# Patient Record
Sex: FEMALE | Race: WHITE | Employment: FULL TIME | URBAN - METROPOLITAN AREA
[De-identification: names, ages, dates, MRNs, and addresses within clinical notes are randomized per-mention and may not be internally consistent; named-entity substitution may affect disease eponyms.]

---

## 2018-04-24 ENCOUNTER — HOSPITAL ENCOUNTER (OUTPATIENT)
Dept: MAMMOGRAPHY | Age: 33
Discharge: HOME | End: 2018-04-24
Attending: OBSTETRICS & GYNECOLOGY
Payer: COMMERCIAL

## 2018-04-24 DIAGNOSIS — N63.10 BREAST MASS, RIGHT: ICD-10-CM

## 2018-04-24 PROCEDURE — 76642 ULTRASOUND BREAST LIMITED: CPT

## 2018-04-24 PROCEDURE — 77066 DX MAMMO INCL CAD BI: CPT

## 2019-01-03 PROBLEM — O34.219 HISTORY OF CESAREAN SECTION COMPLICATING PREGNANCY: Status: ACTIVE | Noted: 2019-01-03

## 2021-02-18 ENCOUNTER — HOSPITAL ENCOUNTER (OUTPATIENT)
Dept: SLEEP MEDICINE | Age: 36
Discharge: HOME | End: 2021-02-18
Payer: COMMERCIAL

## 2021-02-18 PROCEDURE — 95810 POLYSOM 6/> YRS 4/> PARAM: CPT

## 2022-03-19 PROBLEM — O34.219 HISTORY OF CESAREAN SECTION COMPLICATING PREGNANCY: Status: ACTIVE | Noted: 2019-01-03

## 2022-12-27 ENCOUNTER — OFFICE VISIT (OUTPATIENT)
Dept: PRIMARY CARE CLINIC | Facility: CLINIC | Age: 37
End: 2022-12-27
Payer: COMMERCIAL

## 2022-12-27 VITALS
BODY MASS INDEX: 45.99 KG/M2 | SYSTOLIC BLOOD PRESSURE: 136 MMHG | WEIGHT: 293 LBS | HEIGHT: 67 IN | OXYGEN SATURATION: 98 % | HEART RATE: 89 BPM | DIASTOLIC BLOOD PRESSURE: 96 MMHG | TEMPERATURE: 98.4 F

## 2022-12-27 DIAGNOSIS — Z83.3 FAMILY HISTORY OF DIABETES MELLITUS: ICD-10-CM

## 2022-12-27 DIAGNOSIS — R05.3 CHRONIC COUGH: ICD-10-CM

## 2022-12-27 DIAGNOSIS — Z76.89 ENCOUNTER TO ESTABLISH CARE: Primary | ICD-10-CM

## 2022-12-27 DIAGNOSIS — E55.9 VITAMIN D DEFICIENCY: ICD-10-CM

## 2022-12-27 DIAGNOSIS — Z13.220 SCREENING FOR LIPID DISORDERS: ICD-10-CM

## 2022-12-27 DIAGNOSIS — R09.82 POST-NASAL DRAINAGE: ICD-10-CM

## 2022-12-27 DIAGNOSIS — Z79.899 ENCOUNTER FOR LONG-TERM (CURRENT) USE OF MEDICATIONS: ICD-10-CM

## 2022-12-27 DIAGNOSIS — I10 ESSENTIAL HYPERTENSION: ICD-10-CM

## 2022-12-27 DIAGNOSIS — F41.9 ANXIETY: ICD-10-CM

## 2022-12-27 PROCEDURE — 3078F DIAST BP <80 MM HG: CPT | Performed by: FAMILY MEDICINE

## 2022-12-27 PROCEDURE — 99214 OFFICE O/P EST MOD 30 MIN: CPT | Performed by: FAMILY MEDICINE

## 2022-12-27 PROCEDURE — 3074F SYST BP LT 130 MM HG: CPT | Performed by: FAMILY MEDICINE

## 2022-12-27 RX ORDER — TIRZEPATIDE 7.5 MG/.5ML
7.5 INJECTION, SOLUTION SUBCUTANEOUS WEEKLY
Qty: 2 ML | Refills: 5 | Status: SHIPPED | OUTPATIENT
Start: 2023-02-25

## 2022-12-27 RX ORDER — LEVOCETIRIZINE DIHYDROCHLORIDE 5 MG/1
5 TABLET, FILM COATED ORAL NIGHTLY
Qty: 30 TABLET | Refills: 5 | Status: SHIPPED | OUTPATIENT
Start: 2022-12-27

## 2022-12-27 RX ORDER — MONTELUKAST SODIUM 10 MG/1
10 TABLET ORAL DAILY
Qty: 30 TABLET | Refills: 5 | Status: SHIPPED | OUTPATIENT
Start: 2022-12-27

## 2022-12-27 RX ORDER — TIRZEPATIDE 5 MG/.5ML
5 INJECTION, SOLUTION SUBCUTANEOUS WEEKLY
Qty: 2 ML | Refills: 0 | Status: SHIPPED | OUTPATIENT
Start: 2023-01-26 | End: 2023-02-25

## 2022-12-27 RX ORDER — ESCITALOPRAM OXALATE 10 MG/1
10 TABLET ORAL DAILY
Qty: 30 TABLET | Refills: 5 | Status: SHIPPED | OUTPATIENT
Start: 2022-12-27

## 2022-12-27 RX ORDER — TIRZEPATIDE 2.5 MG/.5ML
2.5 INJECTION, SOLUTION SUBCUTANEOUS WEEKLY
Qty: 2 ML | Refills: 0 | Status: SHIPPED | OUTPATIENT
Start: 2022-12-27 | End: 2023-01-26

## 2022-12-27 RX ORDER — OLMESARTAN MEDOXOMIL 20 MG/1
20 TABLET ORAL DAILY
Qty: 30 TABLET | Refills: 5 | Status: SHIPPED | OUTPATIENT
Start: 2022-12-27

## 2022-12-27 ASSESSMENT — PATIENT HEALTH QUESTIONNAIRE - PHQ9
SUM OF ALL RESPONSES TO PHQ QUESTIONS 1-9: 0
1. LITTLE INTEREST OR PLEASURE IN DOING THINGS: 0
SUM OF ALL RESPONSES TO PHQ QUESTIONS 1-9: 0
SUM OF ALL RESPONSES TO PHQ QUESTIONS 1-9: 0
SUM OF ALL RESPONSES TO PHQ9 QUESTIONS 1 & 2: 0
2. FEELING DOWN, DEPRESSED OR HOPELESS: 0
SUM OF ALL RESPONSES TO PHQ QUESTIONS 1-9: 0

## 2022-12-27 NOTE — PROGRESS NOTES
Que Lee M.D.  [address] [facility]  Phone:  [phone]  Fax:  [phone]:  Chief Complaint   Patient presents with    New Patient     Former patient of Dr Kelleen Runner    Hypertension     Checked BP at home and it was elevated. She is under a lot of stress and anxiety. Does get symptomatic     Weight Management     Tried saxenda in the past. Wants to possibly start mounjaro, her  takes mounjaro and he has good affects     Cough     Has sinus issues. Chronic productive cough for 1 month. Was given prednisone and cough med and cough has still not approved. HISTORY OF PRESENT ILLNESS:  Ms. Mary Ellen Bateman is a 30 y.o. female  who presents as a new patient. Her blood pressure has been elevated recently due to stress. Denies chest pain, shortness of breath, headaches or blurred vision. She is complaining of coughing for the past month. The cough is productive of thick mucus. She has post nasal drainage. She went to the urgent care and was given prednisone and cough medicine. Her  takes Marian Mo for his diabetes. She has tried [place] in the past. She would like to try Marian Mo for weight loss. No other complaints. She is not taking any medications. HISTORY:  No Known Allergies      REVIEW OF SYSTEMS:  Review of systems is as indicated in HPI otherwise negative. PHYSICAL EXAM:  Vital Signs -   Visit Vitals  BP (!) 136/96 (Site: Left Upper Arm)   Pulse 89   Temp 98.4 °F (36.9 °C) (Temporal)   Ht 5' 7\" (1.702 m)   Wt (!) 392 lb (177.8 kg)   SpO2 98%   BMI 61.40 kg/m²        Physical Exam  Vitals and nursing note reviewed. Constitutional:       Appearance: Normal appearance. HENT:      Head: Normocephalic and atraumatic. Nose: Nose normal.      Mouth/Throat:      Mouth: Mucous membranes are moist.   Eyes:      Extraocular Movements: Extraocular movements intact.       Pupils: Pupils are equal, round, and reactive to light. Cardiovascular:      Rate and Rhythm: Normal rate and regular rhythm. Pulses: Normal pulses. Pulmonary:      Effort: Pulmonary effort is normal.      Breath sounds: Normal breath sounds. Abdominal:      General: Abdomen is flat. Palpations: Abdomen is soft. Musculoskeletal:         General: Normal range of motion. Cervical back: Normal range of motion and neck supple. Skin:     General: Skin is warm and dry. Neurological:      Mental Status: She is alert. Psychiatric:         Mood and Affect: Mood normal.         Behavior: Behavior normal.         PHQ:  PHQ-9  12/27/2022   Little interest or pleasure in doing things 0   Feeling down, depressed, or hopeless 0   PHQ-2 Score 0   PHQ-9 Total Score 0       LABS  No results found for this visit on 12/27/22.   Office Visit on 01/21/2021   Component Date Value Ref Range Status    WBC 01/21/2021 8.1  3.4 - 10.8 x10E3/uL Final    RBC 01/21/2021 4.75  3.77 - 5.28 x10E6/uL Final    Hemoglobin 01/21/2021 13.6  11.1 - 15.9 g/dL Final    Hematocrit 01/21/2021 40.2  34.0 - 46.6 % Final    MCV 01/21/2021 85  79 - 97 fL Final    MCH 01/21/2021 28.6  26.6 - 33.0 pg Final    MCHC 01/21/2021 33.8  31.5 - 35.7 g/dL Final    RDW 01/21/2021 13.0  11.7 - 15.4 % Final    Platelets 22/16/3551 260  150 - 450 x10E3/uL Final    Neutrophils % 01/21/2021 42  Not Estab. % Final    Lymphocytes % 01/21/2021 44  Not Estab. % Final    Monocytes % 01/21/2021 6  Not Estab. % Final    Eosinophils % 01/21/2021 7  Not Estab. % Final    Basophils % 01/21/2021 1  Not Estab. % Final    Neutrophils Absolute 01/21/2021 3.4  1.4 - 7.0 x10E3/uL Final    Lymphocytes Absolute 01/21/2021 3.5 (A)  0.7 - 3.1 x10E3/uL Final    Monocytes Absolute 01/21/2021 0.5  0.1 - 0.9 x10E3/uL Final    Eosinophils Absolute 01/21/2021 0.6 (A)  0.0 - 0.4 x10E3/uL Final    Basophils Absolute 01/21/2021 0.1  0.0 - 0.2 x10E3/uL Final    Immature Granulocytes 01/21/2021 0  Not Estab. % Final Granulocyte Absolute Count 01/21/2021 0.0  0.0 - 0.1 x10E3/uL Final    TSH 01/21/2021 1.780  0.450 - 4.500 uIU/mL Final    Glucose 01/21/2021 74  65 - 99 mg/dL Final    BUN 01/21/2021 11  6 - 20 mg/dL Final    Creatinine 01/21/2021 0.59  0.57 - 1.00 mg/dL Final    EGFR IF NonAfrican American 01/21/2021 121  >59 mL/min/1.73 Final    GFR  01/21/2021 139  >59 mL/min/1.73 Final    Bun/Cre Ratio 01/21/2021 19  9 - 23 NA Final    Sodium 01/21/2021 139  134 - 144 mmol/L Final    Potassium 01/21/2021 4.5  3.5 - 5.2 mmol/L Final    Chloride 01/21/2021 102  96 - 106 mmol/L Final    CO2 01/21/2021 21  20 - 29 mmol/L Final    Calcium 01/21/2021 9.6  8.7 - 10.2 mg/dL Final    Total Protein 01/21/2021 7.3  6.0 - 8.5 g/dL Final    Albumin 01/21/2021 4.4  3.8 - 4.8 g/dL Final    Globulin, Total 01/21/2021 2.9  1.5 - 4.5 g/dL Final    Albumin/Globulin Ratio 01/21/2021 1.5  1.2 - 2.2 NA Final    Total Bilirubin 01/21/2021 <0.2  0.0 - 1.2 mg/dL Final    Alkaline Phosphatase 01/21/2021 65  39 - 117 IU/L Final    AST 01/21/2021 13  0 - 40 IU/L Final    ALT 01/21/2021 17  0 - 32 IU/L Final       IMPRESSION/PLAN     Diagnosis Orders   1. Encounter to establish care        2. Essential hypertension  olmesartan (BENICAR) 20 MG tablet    CBC with Auto Differential    Comprehensive Metabolic Panel      3. Chronic cough  levocetirizine (XYZAL) 5 MG tablet    montelukast (SINGULAIR) 10 MG tablet      4. Post-nasal drainage  levocetirizine (XYZAL) 5 MG tablet    montelukast (SINGULAIR) 10 MG tablet      5. Vitamin D deficiency  Vitamin D 25 Hydroxy      6. Anxiety  escitalopram (LEXAPRO) 10 MG tablet      7. Family history of diabetes mellitus  Tirzepatide (MOUNJARO) 2.5 MG/0.5ML SOPN SC injection    Tirzepatide (MOUNJARO) 5 MG/0.5ML SOPN SC injection    Tirzepatide (MOUNJARO) 7.5 MG/0.5ML SOPN SC injection    Hemoglobin A1C    mother's side      8. Screening for lipid disorders  Lipid Panel      9.  Encounter for long-term (current) use of medications  CBC with Auto Differential    Comprehensive Metabolic Panel          Follow up and Dispositions:  Return in about 2 months (around 2/27/2023) for SCHEDULE LABS SOON.     Will add the following medications: Benicar 20 mg daily for hypertension, Xyzal 5 mg daily, Singulair 10 mg daily, Lexapro 10 mg daily and Mounjaro 2.5 mg weekly.   Reviewed medications and side effects in detail.  Will check the above labs.  Reviewed diet, exercise and weight control.  Cardiovascular risks and recommendations reviewed.  Patient encouraged to follow a low sodium diet.    Kori Karimi MD    Dictated using voice recognition software. Proofread, but unrecognized voice recognition errors may exist.

## 2023-01-06 DIAGNOSIS — I10 ESSENTIAL HYPERTENSION: ICD-10-CM

## 2023-01-06 DIAGNOSIS — Z79.899 ENCOUNTER FOR LONG-TERM (CURRENT) USE OF MEDICATIONS: ICD-10-CM

## 2023-01-06 DIAGNOSIS — E55.9 VITAMIN D DEFICIENCY: ICD-10-CM

## 2023-01-06 DIAGNOSIS — Z83.3 FAMILY HISTORY OF DIABETES MELLITUS: ICD-10-CM

## 2023-01-06 DIAGNOSIS — Z13.220 SCREENING FOR LIPID DISORDERS: ICD-10-CM

## 2023-01-06 LAB
25(OH)D3 SERPL-MCNC: 17.4 NG/ML (ref 30–100)
ALBUMIN SERPL-MCNC: 3.7 G/DL (ref 3.5–5)
ALBUMIN/GLOB SERPL: 1 (ref 0.4–1.6)
ALP SERPL-CCNC: 49 U/L (ref 50–136)
ALT SERPL-CCNC: 25 U/L (ref 12–65)
ANION GAP SERPL CALC-SCNC: 8 MMOL/L (ref 2–11)
AST SERPL-CCNC: 16 U/L (ref 15–37)
BASOPHILS # BLD: 0 K/UL (ref 0–0.2)
BASOPHILS NFR BLD: 0 % (ref 0–2)
BILIRUB SERPL-MCNC: 0.3 MG/DL (ref 0.2–1.1)
BUN SERPL-MCNC: 12 MG/DL (ref 6–23)
CALCIUM SERPL-MCNC: 9.1 MG/DL (ref 8.3–10.4)
CHLORIDE SERPL-SCNC: 108 MMOL/L (ref 101–110)
CHOLEST SERPL-MCNC: 182 MG/DL
CO2 SERPL-SCNC: 24 MMOL/L (ref 21–32)
CREAT SERPL-MCNC: 0.7 MG/DL (ref 0.6–1)
DIFFERENTIAL METHOD BLD: ABNORMAL
EOSINOPHIL # BLD: 0.4 K/UL (ref 0–0.8)
EOSINOPHIL NFR BLD: 6 % (ref 0.5–7.8)
ERYTHROCYTE [DISTWIDTH] IN BLOOD BY AUTOMATED COUNT: 14.7 % (ref 11.9–14.6)
EST. AVERAGE GLUCOSE BLD GHB EST-MCNC: 97 MG/DL
GLOBULIN SER CALC-MCNC: 3.7 G/DL (ref 2.8–4.5)
GLUCOSE SERPL-MCNC: 83 MG/DL (ref 65–100)
HBA1C MFR BLD: 5 % (ref 4.8–5.6)
HCT VFR BLD AUTO: 39.1 % (ref 35.8–46.3)
HDLC SERPL-MCNC: 58 MG/DL (ref 40–60)
HDLC SERPL: 3.1
HGB BLD-MCNC: 12.3 G/DL (ref 11.7–15.4)
IMM GRANULOCYTES # BLD AUTO: 0 K/UL (ref 0–0.5)
IMM GRANULOCYTES NFR BLD AUTO: 0 % (ref 0–5)
LDLC SERPL CALC-MCNC: 106.6 MG/DL
LYMPHOCYTES # BLD: 2.2 K/UL (ref 0.5–4.6)
LYMPHOCYTES NFR BLD: 34 % (ref 13–44)
MCH RBC QN AUTO: 27.8 PG (ref 26.1–32.9)
MCHC RBC AUTO-ENTMCNC: 31.5 G/DL (ref 31.4–35)
MCV RBC AUTO: 88.3 FL (ref 82–102)
MONOCYTES # BLD: 0.3 K/UL (ref 0.1–1.3)
MONOCYTES NFR BLD: 5 % (ref 4–12)
NEUTS SEG # BLD: 3.6 K/UL (ref 1.7–8.2)
NEUTS SEG NFR BLD: 55 % (ref 43–78)
NRBC # BLD: 0 K/UL (ref 0–0.2)
PLATELET # BLD AUTO: 224 K/UL (ref 150–450)
PMV BLD AUTO: 12.2 FL (ref 9.4–12.3)
POTASSIUM SERPL-SCNC: 4.2 MMOL/L (ref 3.5–5.1)
PROT SERPL-MCNC: 7.4 G/DL (ref 6.3–8.2)
RBC # BLD AUTO: 4.43 M/UL (ref 4.05–5.2)
SODIUM SERPL-SCNC: 140 MMOL/L (ref 133–143)
TRIGL SERPL-MCNC: 87 MG/DL (ref 35–150)
VLDLC SERPL CALC-MCNC: 17.4 MG/DL (ref 6–23)
WBC # BLD AUTO: 6.7 K/UL (ref 4.3–11.1)

## 2023-01-08 DIAGNOSIS — E55.9 VITAMIN D DEFICIENCY: Primary | ICD-10-CM

## 2023-01-08 RX ORDER — ERGOCALCIFEROL 1.25 MG/1
50000 CAPSULE ORAL WEEKLY
Qty: 12 CAPSULE | Refills: 1 | Status: SHIPPED | OUTPATIENT
Start: 2023-01-08

## 2023-01-09 NOTE — RESULT ENCOUNTER NOTE
1. Vitamin D: low at 17.4 - start weekly vitamin D. Prescription has been sent to the pharmacy. Remainder of your labs are good. Patient notified via [address]

## 2023-02-27 ENCOUNTER — OFFICE VISIT (OUTPATIENT)
Dept: PRIMARY CARE CLINIC | Facility: CLINIC | Age: 38
End: 2023-02-27
Payer: COMMERCIAL

## 2023-02-27 VITALS
HEART RATE: 55 BPM | TEMPERATURE: 98.1 F | BODY MASS INDEX: 45.99 KG/M2 | SYSTOLIC BLOOD PRESSURE: 117 MMHG | HEIGHT: 67 IN | WEIGHT: 293 LBS | DIASTOLIC BLOOD PRESSURE: 78 MMHG | OXYGEN SATURATION: 99 %

## 2023-02-27 DIAGNOSIS — Z79.899 ENCOUNTER FOR LONG-TERM (CURRENT) USE OF MEDICATIONS: ICD-10-CM

## 2023-02-27 DIAGNOSIS — E55.9 VITAMIN D DEFICIENCY: ICD-10-CM

## 2023-02-27 DIAGNOSIS — E66.01 MORBID OBESITY WITH BMI OF 60.0-69.9, ADULT (HCC): ICD-10-CM

## 2023-02-27 DIAGNOSIS — I10 ESSENTIAL HYPERTENSION: Primary | ICD-10-CM

## 2023-02-27 DIAGNOSIS — M79.89 LEG SWELLING: ICD-10-CM

## 2023-02-27 PROCEDURE — 99213 OFFICE O/P EST LOW 20 MIN: CPT | Performed by: FAMILY MEDICINE

## 2023-02-27 PROCEDURE — 3074F SYST BP LT 130 MM HG: CPT | Performed by: FAMILY MEDICINE

## 2023-02-27 PROCEDURE — 3078F DIAST BP <80 MM HG: CPT | Performed by: FAMILY MEDICINE

## 2023-02-27 RX ORDER — FUROSEMIDE 20 MG/1
20 TABLET ORAL 2 TIMES DAILY PRN
Qty: 60 TABLET | Refills: 5 | Status: SHIPPED | OUTPATIENT
Start: 2023-02-27

## 2023-02-27 RX ORDER — TIRZEPATIDE 7.5 MG/.5ML
7.5 INJECTION, SOLUTION SUBCUTANEOUS WEEKLY
Qty: 4 ADJUSTABLE DOSE PRE-FILLED PEN SYRINGE | Refills: 5 | Status: SHIPPED | OUTPATIENT
Start: 2023-02-27

## 2023-02-27 RX ORDER — AMOXICILLIN 875 MG/1
TABLET, COATED ORAL
COMMUNITY
Start: 2023-02-15

## 2023-02-27 RX ORDER — ERGOCALCIFEROL 1.25 MG/1
50000 CAPSULE ORAL WEEKLY
Qty: 12 CAPSULE | Refills: 3 | Status: SHIPPED | OUTPATIENT
Start: 2023-02-27

## 2023-02-27 SDOH — ECONOMIC STABILITY: HOUSING INSECURITY
IN THE LAST 12 MONTHS, WAS THERE A TIME WHEN YOU DID NOT HAVE A STEADY PLACE TO SLEEP OR SLEPT IN A SHELTER (INCLUDING NOW)?: PATIENT REFUSED

## 2023-02-27 SDOH — ECONOMIC STABILITY: INCOME INSECURITY: HOW HARD IS IT FOR YOU TO PAY FOR THE VERY BASICS LIKE FOOD, HOUSING, MEDICAL CARE, AND HEATING?: PATIENT DECLINED

## 2023-02-27 SDOH — ECONOMIC STABILITY: FOOD INSECURITY: WITHIN THE PAST 12 MONTHS, THE FOOD YOU BOUGHT JUST DIDN'T LAST AND YOU DIDN'T HAVE MONEY TO GET MORE.: PATIENT DECLINED

## 2023-02-27 SDOH — ECONOMIC STABILITY: FOOD INSECURITY: WITHIN THE PAST 12 MONTHS, YOU WORRIED THAT YOUR FOOD WOULD RUN OUT BEFORE YOU GOT MONEY TO BUY MORE.: PATIENT DECLINED

## 2023-02-27 ASSESSMENT — PATIENT HEALTH QUESTIONNAIRE - PHQ9
SUM OF ALL RESPONSES TO PHQ QUESTIONS 1-9: 0
SUM OF ALL RESPONSES TO PHQ9 QUESTIONS 1 & 2: 0
SUM OF ALL RESPONSES TO PHQ QUESTIONS 1-9: 0
2. FEELING DOWN, DEPRESSED OR HOPELESS: 0
1. LITTLE INTEREST OR PLEASURE IN DOING THINGS: 0

## 2023-02-27 NOTE — PROGRESS NOTES
Fady Rodrigues M.D.  [address] [facility]  Phone:  [phone]  Fax:  [phone]    CHIEF COMPLAINT:  Chief Complaint   Patient presents with    Obesity     Pt states she feels the Rubén Said is helping her appetite. She is still having problems with portion control. Hypertension     Pt does not check blood pressure at home. Pt states she feels better on the medication. HISTORY OF PRESENT ILLNESS:  Ms. Cass Covarrubias is a 30 y.o. female  who presents for follow up. Patient states that she has been doing well. She is taking and Mounjaro and has lost a few pounds. She has been having some swelling in her legs. Patient states that she used to take furosemide. She denies any associated shortness of breath. She denies any chest pain. Her blood pressures been under control. She takes Benicar 20 mg daily. Denies chest pain, shortness of breath, headaches or blurred vision. No other complaints. Taking medications as prescribed. Medications reviewed and updated. HISTORY:  No Known Allergies      REVIEW OF SYSTEMS:  Review of systems is as indicated in HPI otherwise negative. PHYSICAL EXAM:  Vital Signs -   Visit Vitals  /78   Pulse 55   Temp 98.1 °F (36.7 °C) (Temporal)   Ht 5' 7\" (1.702 m)   Wt (!) 391 lb 12.8 oz (177.7 kg)   SpO2 99%   BMI 61.36 kg/m²        Physical Exam  Vitals and nursing note reviewed. Constitutional:       Appearance: Normal appearance. HENT:      Head: Normocephalic and atraumatic. Nose: Nose normal.      Mouth/Throat:      Mouth: Mucous membranes are moist.   Eyes:      Extraocular Movements: Extraocular movements intact. Pupils: Pupils are equal, round, and reactive to light. Cardiovascular:      Rate and Rhythm: Normal rate and regular rhythm. Pulses: Normal pulses. Pulmonary:      Effort: Pulmonary effort is normal.      Breath sounds: Normal breath sounds.    Abdominal: General: Abdomen is flat. Palpations: Abdomen is soft. Musculoskeletal:         General: Normal range of motion. Cervical back: Normal range of motion and neck supple. Skin:     General: Skin is warm and dry. Neurological:      Mental Status: She is alert. Psychiatric:         Mood and Affect: Mood normal.         Behavior: Behavior normal.         PHQ:  PHQ-9  2/27/2023   Little interest or pleasure in doing things 0   Feeling down, depressed, or hopeless 0   PHQ-2 Score 0   PHQ-9 Total Score 0       LABS  No results found for this visit on 02/27/23.   Orders Only on 01/06/2023   Component Date Value Ref Range Status    Vit D, 25-Hydroxy 01/06/2023 17.4 (A)  30.0 - 100.0 ng/mL Final    Hemoglobin A1C 01/06/2023 5.0  4.8 - 5.6 % Final    eAG 01/06/2023 97  mg/dL Final    Comment: Reference Range  Normal: 4.8-5.6  Diabetic >=6.5%  Normal       <5.7%      Cholesterol, Total 01/06/2023 182  <200 MG/DL Final    Comment: Borderline High: 200-239 mg/dL  High: Greater than or equal to 240 mg/dL      Triglycerides 01/06/2023 87  35 - 150 MG/DL Final    Comment: Borderline High: 150-199 mg/dL, High: 200-499 mg/dL  Very High: Greater than or equal to 500 mg/dL      HDL 01/06/2023 58  40 - 60 MG/DL Final    LDL Calculated 01/06/2023 106.6 (A)  <100 MG/DL Final    Comment: Near Optimal: 100-129 mg/dL  Borderline High: 130-159, High: 160-189 mg/dL  Very High: Greater than or equal to 190 mg/dL      VLDL Cholesterol Calculated 01/06/2023 17.4  6.0 - 23.0 MG/DL Final    Chol/HDL Ratio 01/06/2023 3.1    Final    Sodium 01/06/2023 140  133 - 143 mmol/L Final    Potassium 01/06/2023 4.2  3.5 - 5.1 mmol/L Final    Chloride 01/06/2023 108  101 - 110 mmol/L Final    CO2 01/06/2023 24  21 - 32 mmol/L Final    Anion Gap 01/06/2023 8  2 - 11 mmol/L Final    Glucose 01/06/2023 83  65 - 100 mg/dL Final    BUN 01/06/2023 12  6 - 23 MG/DL Final    Creatinine 01/06/2023 0.70  0.6 - 1.0 MG/DL Final    Est, Glom Filt Rate 01/06/2023 >60  >60 ml/min/1.73m2 Final    Comment:   Pediatric calculator link: Karla.at. org/professionals/kdoqi/gfr_calculatorped    These results are not intended for use in patients <25years of age. eGFR results are calculated without a race factor using  the 2021 CKD-EPI equation. Careful clinical correlation is recommended, particularly when comparing to results calculated using previous equations. The CKD-EPI equation is less accurate in patients with extremes of muscle mass, extra-renal metabolism of creatinine, excessive creatine ingestion, or following therapy that affects renal tubular secretion.       Calcium 01/06/2023 9.1  8.3 - 10.4 MG/DL Final    Total Bilirubin 01/06/2023 0.3  0.2 - 1.1 MG/DL Final    ALT 01/06/2023 25  12 - 65 U/L Final    AST 01/06/2023 16  15 - 37 U/L Final    Alk Phosphatase 01/06/2023 49 (A)  50 - 136 U/L Final    Total Protein 01/06/2023 7.4  6.3 - 8.2 g/dL Final    Albumin 01/06/2023 3.7  3.5 - 5.0 g/dL Final    Globulin 01/06/2023 3.7  2.8 - 4.5 g/dL Final    Albumin/Globulin Ratio 01/06/2023 1.0  0.4 - 1.6   Final    WBC 01/06/2023 6.7  4.3 - 11.1 K/uL Final    RBC 01/06/2023 4.43  4.05 - 5.2 M/uL Final    Hemoglobin 01/06/2023 12.3  11.7 - 15.4 g/dL Final    Hematocrit 01/06/2023 39.1  35.8 - 46.3 % Final    MCV 01/06/2023 88.3  82 - 102 FL Final    MCH 01/06/2023 27.8  26.1 - 32.9 PG Final    MCHC 01/06/2023 31.5  31.4 - 35.0 g/dL Final    RDW 01/06/2023 14.7 (A)  11.9 - 14.6 % Final    Platelets 83/45/9002 224  150 - 450 K/uL Final    MPV 01/06/2023 12.2  9.4 - 12.3 FL Final    nRBC 01/06/2023 0.00  0.0 - 0.2 K/uL Final    **Note: Absolute NRBC parameter is now reported with Hemogram**    Differential Type 01/06/2023 AUTOMATED    Final    Seg Neutrophils 01/06/2023 55  43 - 78 % Final    Lymphocytes 01/06/2023 34  13 - 44 % Final    Monocytes 01/06/2023 5  4.0 - 12.0 % Final    Eosinophils % 01/06/2023 6  0.5 - 7.8 % Final    Basophils 01/06/2023 0  0.0 - 2.0 % Final    Immature Granulocytes 01/06/2023 0  0.0 - 5.0 % Final    Segs Absolute 01/06/2023 3.6  1.7 - 8.2 K/UL Final    Absolute Lymph # 01/06/2023 2.2  0.5 - 4.6 K/UL Final    Absolute Mono # 01/06/2023 0.3  0.1 - 1.3 K/UL Final    Absolute Eos # 01/06/2023 0.4  0.0 - 0.8 K/UL Final    Basophils Absolute 01/06/2023 0.0  0.0 - 0.2 K/UL Final    Absolute Immature Granulocyte 01/06/2023 0.0  0.0 - 0.5 K/UL Final       IMPRESSION/PLAN     Diagnosis Orders   1. Essential hypertension        2. Morbid obesity with BMI of 60.0-69.9, adult (HCC)  Tirzepatide (MOUNJARO) 7.5 MG/0.5ML SOPN SC injection      3. Leg swelling  furosemide (LASIX) 20 MG tablet      4. Vitamin D deficiency  vitamin D (ERGOCALCIFEROL) 1.25 MG (37225 UT) CAPS capsule      5. Encounter for long-term (current) use of medications            Follow up and Dispositions:  Return in about 4 months (around 6/27/2023) for [place] [address]. Patient is stable on medications and will continue current medications. Refilled the above medications. Will add the following medications: Furosemide 20 mg as needed for leg swelling. Will change the following medications: increase Mounjaro to 7.5 mg weekly. Reviewed medications and side effects in detail. Reviewed most recent labs. Reviewed diet, exercise and weight control. Corrie Brooke MD    Dictated using voice recognition software.  Proofread, but unrecognized voice recognition errors may exist.

## 2023-03-10 ENCOUNTER — PATIENT MESSAGE (OUTPATIENT)
Dept: PRIMARY CARE CLINIC | Facility: CLINIC | Age: 38
End: 2023-03-10

## 2023-03-10 DIAGNOSIS — E66.01 MORBID OBESITY WITH BMI OF 60.0-69.9, ADULT (HCC): Primary | ICD-10-CM

## 2023-03-10 RX ORDER — DULAGLUTIDE 0.75 MG/.5ML
0.75 INJECTION, SOLUTION SUBCUTANEOUS WEEKLY
Qty: 4 ADJUSTABLE DOSE PRE-FILLED PEN SYRINGE | Refills: 0 | Status: SHIPPED | OUTPATIENT
Start: 2023-03-10 | End: 2023-04-09

## 2023-03-10 RX ORDER — DULAGLUTIDE 3 MG/.5ML
3 INJECTION, SOLUTION SUBCUTANEOUS WEEKLY
Qty: 4 ADJUSTABLE DOSE PRE-FILLED PEN SYRINGE | Refills: 5 | Status: SHIPPED | OUTPATIENT
Start: 2023-05-09

## 2023-03-10 RX ORDER — DULAGLUTIDE 1.5 MG/.5ML
1.5 INJECTION, SOLUTION SUBCUTANEOUS WEEKLY
Qty: 4 ADJUSTABLE DOSE PRE-FILLED PEN SYRINGE | Refills: 0 | Status: SHIPPED | OUTPATIENT
Start: 2023-04-09 | End: 2023-05-09

## 2023-03-10 NOTE — TELEPHONE ENCOUNTER
From: Kelly Teran  To: Dr. Camejo Leonard: 3/10/2023 12:08 PM EST  Subject: Kenny Singh    I have not been able to fill my prescription and now am two weeks of not having Monjaro. Would there be another good weight loss shot I could try while I'm still waiting on the Kenny Singh to come back into stock? The [facility] said there is absolutely no where that currently has it.

## 2023-04-21 DIAGNOSIS — I10 ESSENTIAL HYPERTENSION: ICD-10-CM

## 2023-04-22 RX ORDER — OLMESARTAN MEDOXOMIL 20 MG/1
TABLET ORAL
Qty: 30 TABLET | Refills: 5 | Status: SHIPPED | OUTPATIENT
Start: 2023-04-22

## 2023-05-11 DIAGNOSIS — R05.3 CHRONIC COUGH: ICD-10-CM

## 2023-05-11 DIAGNOSIS — R09.82 POST-NASAL DRAINAGE: ICD-10-CM

## 2023-05-15 RX ORDER — LEVOCETIRIZINE DIHYDROCHLORIDE 5 MG/1
5 TABLET, FILM COATED ORAL NIGHTLY
Qty: 30 TABLET | Refills: 5 | Status: SHIPPED | OUTPATIENT
Start: 2023-05-15

## 2023-06-22 DIAGNOSIS — R05.3 CHRONIC COUGH: ICD-10-CM

## 2023-06-22 DIAGNOSIS — R09.82 POST-NASAL DRAINAGE: ICD-10-CM

## 2023-06-22 DIAGNOSIS — F41.9 ANXIETY: ICD-10-CM

## 2023-06-22 RX ORDER — MONTELUKAST SODIUM 10 MG/1
TABLET ORAL
Qty: 90 TABLET | Refills: 1 | Status: SHIPPED | OUTPATIENT
Start: 2023-06-22

## 2023-06-22 RX ORDER — ESCITALOPRAM OXALATE 10 MG/1
TABLET ORAL
Qty: 90 TABLET | Refills: 1 | Status: SHIPPED | OUTPATIENT
Start: 2023-06-22

## 2023-07-10 DIAGNOSIS — M79.89 LEG SWELLING: ICD-10-CM

## 2023-07-10 RX ORDER — FUROSEMIDE 20 MG/1
20 TABLET ORAL 2 TIMES DAILY PRN
Qty: 180 TABLET | Refills: 1 | Status: SHIPPED | OUTPATIENT
Start: 2023-07-10

## 2023-07-14 ENCOUNTER — NURSE ONLY (OUTPATIENT)
Dept: PRIMARY CARE CLINIC | Facility: CLINIC | Age: 38
End: 2023-07-14

## 2023-07-14 DIAGNOSIS — Z83.3 FAMILY HISTORY OF DIABETES MELLITUS: ICD-10-CM

## 2023-07-14 DIAGNOSIS — Z79.899 ENCOUNTER FOR LONG-TERM (CURRENT) USE OF MEDICATIONS: ICD-10-CM

## 2023-07-14 DIAGNOSIS — Z13.220 SCREENING FOR LIPID DISORDERS: ICD-10-CM

## 2023-07-14 DIAGNOSIS — I10 ESSENTIAL HYPERTENSION: Primary | ICD-10-CM

## 2023-07-14 DIAGNOSIS — E55.9 VITAMIN D DEFICIENCY: ICD-10-CM

## 2023-07-14 LAB
ALBUMIN SERPL-MCNC: 3.6 G/DL (ref 3.5–5)
ALBUMIN/GLOB SERPL: 0.9 (ref 0.4–1.6)
ALP SERPL-CCNC: 57 U/L (ref 50–136)
ALT SERPL-CCNC: 32 U/L (ref 12–65)
ANION GAP SERPL CALC-SCNC: 10 MMOL/L (ref 2–11)
AST SERPL-CCNC: 22 U/L (ref 15–37)
BASOPHILS # BLD: 0 K/UL (ref 0–0.2)
BASOPHILS NFR BLD: 1 % (ref 0–2)
BILIRUB SERPL-MCNC: 0.3 MG/DL (ref 0.2–1.1)
BUN SERPL-MCNC: 11 MG/DL (ref 6–23)
CALCIUM SERPL-MCNC: 8.9 MG/DL (ref 8.3–10.4)
CHLORIDE SERPL-SCNC: 109 MMOL/L (ref 101–110)
CHOLEST SERPL-MCNC: 182 MG/DL
CO2 SERPL-SCNC: 21 MMOL/L (ref 21–32)
CREAT SERPL-MCNC: 0.7 MG/DL (ref 0.6–1)
DIFFERENTIAL METHOD BLD: ABNORMAL
EOSINOPHIL # BLD: 0.6 K/UL (ref 0–0.8)
EOSINOPHIL NFR BLD: 9 % (ref 0.5–7.8)
ERYTHROCYTE [DISTWIDTH] IN BLOOD BY AUTOMATED COUNT: 15.2 % (ref 11.9–14.6)
GLOBULIN SER CALC-MCNC: 3.8 G/DL (ref 2.8–4.5)
GLUCOSE SERPL-MCNC: 71 MG/DL (ref 65–100)
HCT VFR BLD AUTO: 40.4 % (ref 35.8–46.3)
HDLC SERPL-MCNC: 59 MG/DL (ref 40–60)
HDLC SERPL: 3.1
HGB BLD-MCNC: 12.5 G/DL (ref 11.7–15.4)
IMM GRANULOCYTES # BLD AUTO: 0 K/UL (ref 0–0.5)
IMM GRANULOCYTES NFR BLD AUTO: 0 % (ref 0–5)
LDLC SERPL CALC-MCNC: 102.8 MG/DL
LYMPHOCYTES # BLD: 2.3 K/UL (ref 0.5–4.6)
LYMPHOCYTES NFR BLD: 35 % (ref 13–44)
MCH RBC QN AUTO: 27.7 PG (ref 26.1–32.9)
MCHC RBC AUTO-ENTMCNC: 30.9 G/DL (ref 31.4–35)
MCV RBC AUTO: 89.4 FL (ref 82–102)
MONOCYTES # BLD: 0.3 K/UL (ref 0.1–1.3)
MONOCYTES NFR BLD: 5 % (ref 4–12)
NEUTS SEG # BLD: 3.3 K/UL (ref 1.7–8.2)
NEUTS SEG NFR BLD: 50 % (ref 43–78)
NRBC # BLD: 0 K/UL (ref 0–0.2)
PLATELET # BLD AUTO: 259 K/UL (ref 150–450)
PMV BLD AUTO: 11.5 FL (ref 9.4–12.3)
POTASSIUM SERPL-SCNC: 4.4 MMOL/L (ref 3.5–5.1)
PROT SERPL-MCNC: 7.4 G/DL (ref 6.3–8.2)
RBC # BLD AUTO: 4.52 M/UL (ref 4.05–5.2)
SODIUM SERPL-SCNC: 140 MMOL/L (ref 133–143)
TRIGL SERPL-MCNC: 101 MG/DL (ref 35–150)
VLDLC SERPL CALC-MCNC: 20.2 MG/DL (ref 6–23)
WBC # BLD AUTO: 6.5 K/UL (ref 4.3–11.1)

## 2023-07-15 LAB
25(OH)D3 SERPL-MCNC: 27 NG/ML (ref 30–100)
EST. AVERAGE GLUCOSE BLD GHB EST-MCNC: 97 MG/DL
HBA1C MFR BLD: 5 % (ref 4.8–5.6)

## 2023-07-17 ASSESSMENT — PATIENT HEALTH QUESTIONNAIRE - PHQ9
2. FEELING DOWN, DEPRESSED OR HOPELESS: 0
SUM OF ALL RESPONSES TO PHQ9 QUESTIONS 1 & 2: 0
SUM OF ALL RESPONSES TO PHQ QUESTIONS 1-9: 0
2. FEELING DOWN, DEPRESSED OR HOPELESS: NOT AT ALL
SUM OF ALL RESPONSES TO PHQ QUESTIONS 1-9: 0
1. LITTLE INTEREST OR PLEASURE IN DOING THINGS: NOT AT ALL
1. LITTLE INTEREST OR PLEASURE IN DOING THINGS: 0
SUM OF ALL RESPONSES TO PHQ9 QUESTIONS 1 & 2: 0

## 2023-07-18 ENCOUNTER — OFFICE VISIT (OUTPATIENT)
Dept: PRIMARY CARE CLINIC | Facility: CLINIC | Age: 38
End: 2023-07-18
Payer: COMMERCIAL

## 2023-07-18 VITALS
BODY MASS INDEX: 45.99 KG/M2 | OXYGEN SATURATION: 99 % | TEMPERATURE: 98 F | SYSTOLIC BLOOD PRESSURE: 138 MMHG | HEIGHT: 67 IN | WEIGHT: 293 LBS | HEART RATE: 72 BPM | DIASTOLIC BLOOD PRESSURE: 88 MMHG

## 2023-07-18 DIAGNOSIS — Z83.3 FAMILY HISTORY OF DIABETES MELLITUS: ICD-10-CM

## 2023-07-18 DIAGNOSIS — E55.9 VITAMIN D DEFICIENCY: ICD-10-CM

## 2023-07-18 DIAGNOSIS — R63.2 BINGE EATING: ICD-10-CM

## 2023-07-18 DIAGNOSIS — Z79.899 ENCOUNTER FOR LONG-TERM (CURRENT) USE OF MEDICATIONS: ICD-10-CM

## 2023-07-18 DIAGNOSIS — E66.01 MORBID OBESITY WITH BMI OF 60.0-69.9, ADULT (HCC): Primary | ICD-10-CM

## 2023-07-18 PROCEDURE — 99213 OFFICE O/P EST LOW 20 MIN: CPT | Performed by: FAMILY MEDICINE

## 2023-07-18 PROCEDURE — 3079F DIAST BP 80-89 MM HG: CPT | Performed by: FAMILY MEDICINE

## 2023-07-18 PROCEDURE — 3075F SYST BP GE 130 - 139MM HG: CPT | Performed by: FAMILY MEDICINE

## 2023-07-18 RX ORDER — LEVOCETIRIZINE DIHYDROCHLORIDE 5 MG/1
TABLET, FILM COATED ORAL
COMMUNITY

## 2023-07-18 RX ORDER — DULAGLUTIDE 4.5 MG/.5ML
4.5 INJECTION, SOLUTION SUBCUTANEOUS WEEKLY
Qty: 4 ADJUSTABLE DOSE PRE-FILLED PEN SYRINGE | Refills: 5 | Status: SHIPPED | OUTPATIENT
Start: 2023-07-18

## 2023-07-18 SDOH — ECONOMIC STABILITY: HOUSING INSECURITY
IN THE LAST 12 MONTHS, WAS THERE A TIME WHEN YOU DID NOT HAVE A STEADY PLACE TO SLEEP OR SLEPT IN A SHELTER (INCLUDING NOW)?: NO

## 2023-07-18 SDOH — ECONOMIC STABILITY: FOOD INSECURITY: WITHIN THE PAST 12 MONTHS, THE FOOD YOU BOUGHT JUST DIDN'T LAST AND YOU DIDN'T HAVE MONEY TO GET MORE.: NEVER TRUE

## 2023-07-18 SDOH — ECONOMIC STABILITY: FOOD INSECURITY: WITHIN THE PAST 12 MONTHS, YOU WORRIED THAT YOUR FOOD WOULD RUN OUT BEFORE YOU GOT MONEY TO BUY MORE.: NEVER TRUE

## 2023-07-18 SDOH — ECONOMIC STABILITY: INCOME INSECURITY: HOW HARD IS IT FOR YOU TO PAY FOR THE VERY BASICS LIKE FOOD, HOUSING, MEDICAL CARE, AND HEATING?: NOT HARD AT ALL

## 2023-07-18 ASSESSMENT — ANXIETY QUESTIONNAIRES
3. WORRYING TOO MUCH ABOUT DIFFERENT THINGS: 1
IF YOU CHECKED OFF ANY PROBLEMS ON THIS QUESTIONNAIRE, HOW DIFFICULT HAVE THESE PROBLEMS MADE IT FOR YOU TO DO YOUR WORK, TAKE CARE OF THINGS AT HOME, OR GET ALONG WITH OTHER PEOPLE: SOMEWHAT DIFFICULT
5. BEING SO RESTLESS THAT IT IS HARD TO SIT STILL: 0
2. NOT BEING ABLE TO STOP OR CONTROL WORRYING: 0
GAD7 TOTAL SCORE: 5
4. TROUBLE RELAXING: 0
6. BECOMING EASILY ANNOYED OR IRRITABLE: 2
7. FEELING AFRAID AS IF SOMETHING AWFUL MIGHT HAPPEN: 1
1. FEELING NERVOUS, ANXIOUS, OR ON EDGE: 1

## 2023-08-17 ENCOUNTER — PATIENT MESSAGE (OUTPATIENT)
Dept: PRIMARY CARE CLINIC | Facility: CLINIC | Age: 38
End: 2023-08-17

## 2023-08-17 DIAGNOSIS — R63.2 BINGE EATING: Primary | ICD-10-CM

## 2023-10-24 ENCOUNTER — OFFICE VISIT (OUTPATIENT)
Dept: PRIMARY CARE CLINIC | Facility: CLINIC | Age: 38
End: 2023-10-24
Payer: COMMERCIAL

## 2023-10-24 VITALS
WEIGHT: 293 LBS | HEIGHT: 67 IN | BODY MASS INDEX: 45.99 KG/M2 | SYSTOLIC BLOOD PRESSURE: 120 MMHG | HEART RATE: 67 BPM | TEMPERATURE: 97.8 F | DIASTOLIC BLOOD PRESSURE: 74 MMHG | OXYGEN SATURATION: 93 %

## 2023-10-24 DIAGNOSIS — E66.01 MORBID OBESITY WITH BMI OF 50.0-59.9, ADULT (HCC): Primary | ICD-10-CM

## 2023-10-24 DIAGNOSIS — Z83.3 FAMILY HISTORY OF DIABETES MELLITUS: ICD-10-CM

## 2023-10-24 DIAGNOSIS — R63.2 BINGE EATING: ICD-10-CM

## 2023-10-24 DIAGNOSIS — E55.9 VITAMIN D DEFICIENCY: ICD-10-CM

## 2023-10-24 DIAGNOSIS — R05.8 DRY COUGH: ICD-10-CM

## 2023-10-24 DIAGNOSIS — Z13.220 SCREENING FOR LIPID DISORDERS: ICD-10-CM

## 2023-10-24 DIAGNOSIS — J40 BRONCHITIS: ICD-10-CM

## 2023-10-24 DIAGNOSIS — Z79.899 ENCOUNTER FOR LONG-TERM (CURRENT) USE OF MEDICATIONS: ICD-10-CM

## 2023-10-24 DIAGNOSIS — R06.2 WHEEZING: ICD-10-CM

## 2023-10-24 DIAGNOSIS — R09.82 POST-NASAL DRAINAGE: ICD-10-CM

## 2023-10-24 DIAGNOSIS — Z23 FLU VACCINE NEED: ICD-10-CM

## 2023-10-24 PROCEDURE — 99214 OFFICE O/P EST MOD 30 MIN: CPT | Performed by: FAMILY MEDICINE

## 2023-10-24 PROCEDURE — 3074F SYST BP LT 130 MM HG: CPT | Performed by: FAMILY MEDICINE

## 2023-10-24 PROCEDURE — 90674 CCIIV4 VAC NO PRSV 0.5 ML IM: CPT | Performed by: FAMILY MEDICINE

## 2023-10-24 PROCEDURE — 90471 IMMUNIZATION ADMIN: CPT | Performed by: FAMILY MEDICINE

## 2023-10-24 PROCEDURE — 3078F DIAST BP <80 MM HG: CPT | Performed by: FAMILY MEDICINE

## 2023-10-24 RX ORDER — HYDROCODONE POLISTIREX AND CHLORPHENIRAMINE POLISTIREX 10; 8 MG/5ML; MG/5ML
5 SUSPENSION, EXTENDED RELEASE ORAL EVERY 12 HOURS PRN
Qty: 120 ML | Refills: 0 | Status: SHIPPED | OUTPATIENT
Start: 2023-10-24 | End: 2023-11-07

## 2023-10-24 RX ORDER — AZITHROMYCIN 250 MG/1
250 TABLET, FILM COATED ORAL SEE ADMIN INSTRUCTIONS
Qty: 6 TABLET | Refills: 0 | Status: SHIPPED | OUTPATIENT
Start: 2023-10-24 | End: 2023-10-29

## 2023-10-24 RX ORDER — BUDESONIDE, GLYCOPYRROLATE, AND FORMOTEROL FUMARATE 160; 9; 4.8 UG/1; UG/1; UG/1
2 AEROSOL, METERED RESPIRATORY (INHALATION) 2 TIMES DAILY
Qty: 1 EACH | Refills: 0 | Status: SHIPPED | COMMUNITY
Start: 2023-10-24

## 2023-10-24 RX ORDER — METHYLPREDNISOLONE 4 MG/1
TABLET ORAL
Qty: 1 KIT | Refills: 0 | Status: SHIPPED | OUTPATIENT
Start: 2023-10-24

## 2023-10-24 RX ORDER — PHENTERMINE HYDROCHLORIDE 37.5 MG/1
37.5 TABLET ORAL
Qty: 30 TABLET | Refills: 3 | Status: SHIPPED | OUTPATIENT
Start: 2023-10-24 | End: 2023-11-23

## 2023-10-24 NOTE — PROGRESS NOTES
Absolute Immature Granulocyte 07/14/2023 0.0  0.0 - 0.5 K/UL Final    Sodium 07/14/2023 140  133 - 143 mmol/L Final    Potassium 07/14/2023 4.4  3.5 - 5.1 mmol/L Final    Chloride 07/14/2023 109  101 - 110 mmol/L Final    CO2 07/14/2023 21  21 - 32 mmol/L Final    Anion Gap 07/14/2023 10  2 - 11 mmol/L Final    Glucose 07/14/2023 71  65 - 100 mg/dL Final    BUN 07/14/2023 11  6 - 23 MG/DL Final    Creatinine 07/14/2023 0.70  0.6 - 1.0 MG/DL Final    Est, Glom Filt Rate 07/14/2023 >60  >60 ml/min/1.73m2 Final    Comment:   Pediatric calculator link: Karla.at. org/professionals/kdoqi/gfr_calculatorped    These results are not intended for use in patients <25years of age. eGFR results are calculated without a race factor using  the 2021 CKD-EPI equation. Careful clinical correlation is recommended, particularly when comparing to results calculated using previous equations. The CKD-EPI equation is less accurate in patients with extremes of muscle mass, extra-renal metabolism of creatinine, excessive creatine ingestion, or following therapy that affects renal tubular secretion.       Calcium 07/14/2023 8.9  8.3 - 10.4 MG/DL Final    Total Bilirubin 07/14/2023 0.3  0.2 - 1.1 MG/DL Final    ALT 07/14/2023 32  12 - 65 U/L Final    AST 07/14/2023 22  15 - 37 U/L Final    Alk Phosphatase 07/14/2023 57  50 - 136 U/L Final    Total Protein 07/14/2023 7.4  6.3 - 8.2 g/dL Final    Albumin 07/14/2023 3.6  3.5 - 5.0 g/dL Final    Globulin 07/14/2023 3.8  2.8 - 4.5 g/dL Final    Albumin/Globulin Ratio 07/14/2023 0.9  0.4 - 1.6   Final    Vit D, 25-Hydroxy 07/14/2023 27.0 (L)  30.0 - 100.0 ng/mL Final    Cholesterol, Total 07/14/2023 182  <200 MG/DL Final    Comment: Borderline High: 200-239 mg/dL  High: Greater than or equal to 240 mg/dL      Triglycerides 07/14/2023 101  35 - 150 MG/DL Final    Comment: Borderline High: 150-199 mg/dL, High: 200-499 mg/dL  Very High: Greater than or equal to 500 mg/dL      HDL

## 2023-11-01 DIAGNOSIS — R09.82 POSTNASAL DRIP: ICD-10-CM

## 2023-11-01 DIAGNOSIS — I10 ESSENTIAL HYPERTENSION: ICD-10-CM

## 2023-11-01 DIAGNOSIS — R05.3 CHRONIC COUGH: ICD-10-CM

## 2023-11-01 RX ORDER — OLMESARTAN MEDOXOMIL 20 MG/1
TABLET ORAL
Qty: 30 TABLET | Refills: 5 | Status: SHIPPED | OUTPATIENT
Start: 2023-11-01

## 2023-11-01 RX ORDER — LEVOCETIRIZINE DIHYDROCHLORIDE 5 MG/1
5 TABLET, FILM COATED ORAL
Qty: 30 TABLET | Refills: 5 | Status: SHIPPED | OUTPATIENT
Start: 2023-11-01

## 2023-11-04 DIAGNOSIS — M79.89 LEG SWELLING: ICD-10-CM

## 2023-11-05 RX ORDER — FUROSEMIDE 20 MG/1
20 TABLET ORAL 2 TIMES DAILY PRN
Qty: 180 TABLET | Refills: 1 | Status: SHIPPED | OUTPATIENT
Start: 2023-11-05

## 2023-12-24 DIAGNOSIS — R09.82 POST-NASAL DRAINAGE: ICD-10-CM

## 2023-12-24 DIAGNOSIS — R05.3 CHRONIC COUGH: ICD-10-CM

## 2023-12-24 DIAGNOSIS — F41.9 ANXIETY: ICD-10-CM

## 2023-12-26 RX ORDER — ESCITALOPRAM OXALATE 10 MG/1
TABLET ORAL
Qty: 90 TABLET | Refills: 1 | OUTPATIENT
Start: 2023-12-26

## 2023-12-26 RX ORDER — MONTELUKAST SODIUM 10 MG/1
TABLET ORAL
Qty: 90 TABLET | Refills: 1 | OUTPATIENT
Start: 2023-12-26

## 2024-01-20 DIAGNOSIS — E55.9 VITAMIN D DEFICIENCY: ICD-10-CM

## 2024-01-20 DIAGNOSIS — E66.01 MORBID OBESITY WITH BMI OF 60.0-69.9, ADULT (HCC): ICD-10-CM

## 2024-01-20 DIAGNOSIS — Z83.3 FAMILY HISTORY OF DIABETES MELLITUS: ICD-10-CM

## 2024-01-20 DIAGNOSIS — F41.9 ANXIETY: ICD-10-CM

## 2024-01-20 DIAGNOSIS — R63.2 BINGE EATING: ICD-10-CM

## 2024-01-20 DIAGNOSIS — R09.82 POST-NASAL DRAINAGE: ICD-10-CM

## 2024-01-20 DIAGNOSIS — M79.89 LEG SWELLING: ICD-10-CM

## 2024-01-20 DIAGNOSIS — R05.3 CHRONIC COUGH: ICD-10-CM

## 2024-01-20 RX ORDER — DULAGLUTIDE 4.5 MG/.5ML
4.5 INJECTION, SOLUTION SUBCUTANEOUS WEEKLY
Qty: 4 ADJUSTABLE DOSE PRE-FILLED PEN SYRINGE | Refills: 5 | Status: SHIPPED | OUTPATIENT
Start: 2024-01-20

## 2024-01-20 RX ORDER — ESCITALOPRAM OXALATE 10 MG/1
10 TABLET ORAL DAILY
Qty: 90 TABLET | Refills: 1 | Status: SHIPPED | OUTPATIENT
Start: 2024-01-20

## 2024-01-20 RX ORDER — FUROSEMIDE 20 MG/1
20 TABLET ORAL 2 TIMES DAILY PRN
Qty: 180 TABLET | Refills: 1 | Status: SHIPPED | OUTPATIENT
Start: 2024-01-20

## 2024-01-20 RX ORDER — MONTELUKAST SODIUM 10 MG/1
10 TABLET ORAL DAILY
Qty: 90 TABLET | Refills: 1 | Status: SHIPPED | OUTPATIENT
Start: 2024-01-20

## 2024-01-20 RX ORDER — ERGOCALCIFEROL 1.25 MG/1
50000 CAPSULE ORAL WEEKLY
Qty: 12 CAPSULE | Refills: 3 | Status: SHIPPED | OUTPATIENT
Start: 2024-01-20

## 2024-02-27 DIAGNOSIS — Z83.3 FAMILY HISTORY OF DIABETES MELLITUS: ICD-10-CM

## 2024-02-27 DIAGNOSIS — Z79.899 ENCOUNTER FOR LONG-TERM (CURRENT) USE OF MEDICATIONS: ICD-10-CM

## 2024-02-27 DIAGNOSIS — E55.9 VITAMIN D DEFICIENCY: ICD-10-CM

## 2024-02-27 DIAGNOSIS — Z13.220 SCREENING FOR LIPID DISORDERS: ICD-10-CM

## 2024-02-27 LAB
ALBUMIN SERPL-MCNC: 3.6 G/DL (ref 3.5–5)
ALBUMIN/GLOB SERPL: 0.9 (ref 0.4–1.6)
ALP SERPL-CCNC: 60 U/L (ref 50–136)
ALT SERPL-CCNC: 28 U/L (ref 12–65)
ANION GAP SERPL CALC-SCNC: 5 MMOL/L (ref 2–11)
AST SERPL-CCNC: 20 U/L (ref 15–37)
BASOPHILS # BLD: 0 K/UL (ref 0–0.2)
BASOPHILS NFR BLD: 1 % (ref 0–2)
BILIRUB SERPL-MCNC: 0.3 MG/DL (ref 0.2–1.1)
BUN SERPL-MCNC: 14 MG/DL (ref 6–23)
CALCIUM SERPL-MCNC: 9 MG/DL (ref 8.3–10.4)
CHLORIDE SERPL-SCNC: 106 MMOL/L (ref 103–113)
CHOLEST SERPL-MCNC: 180 MG/DL
CO2 SERPL-SCNC: 27 MMOL/L (ref 21–32)
CREAT SERPL-MCNC: 0.7 MG/DL (ref 0.6–1)
DIFFERENTIAL METHOD BLD: NORMAL
EOSINOPHIL NFR BLD: 7 % (ref 0.5–7.8)
ERYTHROCYTE [DISTWIDTH] IN BLOOD BY AUTOMATED COUNT: 14.2 % (ref 11.9–14.6)
GLOBULIN SER CALC-MCNC: 3.8 G/DL (ref 2.8–4.5)
GLUCOSE SERPL-MCNC: 80 MG/DL (ref 65–100)
HCT VFR BLD AUTO: 39 % (ref 35.8–46.3)
HDLC SERPL-MCNC: 56 MG/DL (ref 40–60)
HDLC SERPL: 3.2
IMM GRANULOCYTES # BLD AUTO: 0 K/UL (ref 0–0.5)
IMM GRANULOCYTES NFR BLD AUTO: 0 % (ref 0–5)
LDLC SERPL CALC-MCNC: 102.2 MG/DL
LYMPHOCYTES # BLD: 2.2 K/UL (ref 0.5–4.6)
LYMPHOCYTES NFR BLD: 38 % (ref 13–44)
MCH RBC QN AUTO: 27.4 PG (ref 26.1–32.9)
MCHC RBC AUTO-ENTMCNC: 31.5 G/DL (ref 31.4–35)
MONOCYTES NFR BLD: 5 % (ref 4–12)
NEUTS SEG # BLD: 2.8 K/UL (ref 1.7–8.2)
NEUTS SEG NFR BLD: 49 % (ref 43–78)
NRBC # BLD: 0 K/UL (ref 0–0.2)
PLATELET # BLD AUTO: 246 K/UL (ref 150–450)
PMV BLD AUTO: 11.4 FL (ref 9.4–12.3)
POTASSIUM SERPL-SCNC: 4.3 MMOL/L (ref 3.5–5.1)
RBC # BLD AUTO: 4.49 M/UL (ref 4.05–5.2)
SODIUM SERPL-SCNC: 138 MMOL/L (ref 136–146)
TRIGL SERPL-MCNC: 109 MG/DL (ref 35–150)
WBC # BLD AUTO: 5.7 K/UL (ref 4.3–11.1)

## 2024-02-28 LAB — HBA1C MFR BLD: 4.9 % (ref 4.8–5.6)

## 2024-03-04 ENCOUNTER — OFFICE VISIT (OUTPATIENT)
Dept: PRIMARY CARE CLINIC | Facility: CLINIC | Age: 39
End: 2024-03-04
Payer: COMMERCIAL

## 2024-03-04 VITALS
SYSTOLIC BLOOD PRESSURE: 122 MMHG | BODY MASS INDEX: 45.99 KG/M2 | TEMPERATURE: 97 F | OXYGEN SATURATION: 97 % | DIASTOLIC BLOOD PRESSURE: 75 MMHG | HEIGHT: 67 IN | WEIGHT: 293 LBS | HEART RATE: 61 BPM

## 2024-03-04 DIAGNOSIS — E55.9 VITAMIN D DEFICIENCY: ICD-10-CM

## 2024-03-04 DIAGNOSIS — F41.9 ANXIETY: ICD-10-CM

## 2024-03-04 DIAGNOSIS — Z83.3 FAMILY HISTORY OF DIABETES MELLITUS: ICD-10-CM

## 2024-03-04 DIAGNOSIS — R63.2 BINGE EATING: ICD-10-CM

## 2024-03-04 DIAGNOSIS — R05.3 CHRONIC COUGH: ICD-10-CM

## 2024-03-04 DIAGNOSIS — E66.01 MORBID OBESITY WITH BMI OF 50.0-59.9, ADULT (HCC): ICD-10-CM

## 2024-03-04 DIAGNOSIS — R09.82 POST-NASAL DRAINAGE: ICD-10-CM

## 2024-03-04 DIAGNOSIS — Z79.899 ENCOUNTER FOR LONG-TERM (CURRENT) USE OF MEDICATIONS: ICD-10-CM

## 2024-03-04 DIAGNOSIS — L91.8 MULTIPLE ACQUIRED SKIN TAGS: ICD-10-CM

## 2024-03-04 DIAGNOSIS — M79.89 LEG SWELLING: ICD-10-CM

## 2024-03-04 DIAGNOSIS — I10 ESSENTIAL HYPERTENSION: Primary | ICD-10-CM

## 2024-03-04 PROCEDURE — 99213 OFFICE O/P EST LOW 20 MIN: CPT | Performed by: FAMILY MEDICINE

## 2024-03-04 PROCEDURE — 3074F SYST BP LT 130 MM HG: CPT | Performed by: FAMILY MEDICINE

## 2024-03-04 PROCEDURE — 3078F DIAST BP <80 MM HG: CPT | Performed by: FAMILY MEDICINE

## 2024-03-04 RX ORDER — ERGOCALCIFEROL 1.25 MG/1
50000 CAPSULE ORAL WEEKLY
Qty: 12 CAPSULE | Refills: 3 | Status: SHIPPED | OUTPATIENT
Start: 2024-03-04

## 2024-03-04 RX ORDER — OLMESARTAN MEDOXOMIL 20 MG/1
20 TABLET ORAL DAILY
Qty: 90 TABLET | Refills: 3 | Status: SHIPPED | OUTPATIENT
Start: 2024-03-04

## 2024-03-04 RX ORDER — DULAGLUTIDE 4.5 MG/.5ML
4.5 INJECTION, SOLUTION SUBCUTANEOUS WEEKLY
Qty: 12 ADJUSTABLE DOSE PRE-FILLED PEN SYRINGE | Refills: 3 | Status: SHIPPED | OUTPATIENT
Start: 2024-03-04

## 2024-03-04 RX ORDER — LEVOCETIRIZINE DIHYDROCHLORIDE 5 MG/1
5 TABLET, FILM COATED ORAL NIGHTLY
Qty: 90 TABLET | Refills: 3 | Status: SHIPPED | OUTPATIENT
Start: 2024-03-04

## 2024-03-04 RX ORDER — MONTELUKAST SODIUM 10 MG/1
10 TABLET ORAL DAILY
Qty: 90 TABLET | Refills: 3 | Status: SHIPPED | OUTPATIENT
Start: 2024-03-04

## 2024-03-04 RX ORDER — PHENTERMINE HYDROCHLORIDE 37.5 MG/1
37.5 TABLET ORAL
Qty: 30 TABLET | Refills: 5 | Status: SHIPPED | OUTPATIENT
Start: 2024-03-04 | End: 2024-04-03

## 2024-03-04 RX ORDER — ESCITALOPRAM OXALATE 10 MG/1
10 TABLET ORAL DAILY
Qty: 90 TABLET | Refills: 3 | Status: SHIPPED | OUTPATIENT
Start: 2024-03-04

## 2024-03-04 RX ORDER — FUROSEMIDE 20 MG/1
20 TABLET ORAL 2 TIMES DAILY PRN
Qty: 180 TABLET | Refills: 3 | Status: SHIPPED | OUTPATIENT
Start: 2024-03-04

## 2024-03-04 ASSESSMENT — PATIENT HEALTH QUESTIONNAIRE - PHQ9
SUM OF ALL RESPONSES TO PHQ9 QUESTIONS 1 & 2: 0
1. LITTLE INTEREST OR PLEASURE IN DOING THINGS: 0
SUM OF ALL RESPONSES TO PHQ QUESTIONS 1-9: 0
2. FEELING DOWN, DEPRESSED OR HOPELESS: 0
SUM OF ALL RESPONSES TO PHQ QUESTIONS 1-9: 0

## 2024-03-04 NOTE — PROGRESS NOTES
Creatinine 02/27/2024 0.70  0.6 - 1.0 MG/DL Final    Est, Glom Filt Rate 02/27/2024 >60  >60 ml/min/1.73m2 Final    Comment:   Pediatric calculator link: https://www.kidney.org/professionals/kdoqi/gfr_calculatorped    These results are not intended for use in patients <18 years of age.    eGFR results are calculated without a race factor using  the 2021 CKD-EPI equation. Careful clinical correlation is recommended, particularly when comparing to results calculated using previous equations.  The CKD-EPI equation is less accurate in patients with extremes of muscle mass, extra-renal metabolism of creatinine, excessive creatine ingestion, or following therapy that affects renal tubular secretion.      Calcium 02/27/2024 9.0  8.3 - 10.4 MG/DL Final    Total Bilirubin 02/27/2024 0.3  0.2 - 1.1 MG/DL Final    ALT 02/27/2024 28  12 - 65 U/L Final    AST 02/27/2024 20  15 - 37 U/L Final    Alk Phosphatase 02/27/2024 60  50 - 136 U/L Final    Total Protein 02/27/2024 7.4  6.3 - 8.2 g/dL Final    Albumin 02/27/2024 3.6  3.5 - 5.0 g/dL Final    Globulin 02/27/2024 3.8  2.8 - 4.5 g/dL Final    Albumin/Globulin Ratio 02/27/2024 0.9  0.4 - 1.6   Final    WBC 02/27/2024 5.7  4.3 - 11.1 K/uL Final    RBC 02/27/2024 4.49  4.05 - 5.2 M/uL Final    Hemoglobin 02/27/2024 12.3  11.7 - 15.4 g/dL Final    Hematocrit 02/27/2024 39.0  35.8 - 46.3 % Final    MCV 02/27/2024 86.9  82 - 102 FL Final    MCH 02/27/2024 27.4  26.1 - 32.9 PG Final    MCHC 02/27/2024 31.5  31.4 - 35.0 g/dL Final    RDW 02/27/2024 14.2  11.9 - 14.6 % Final    Platelets 02/27/2024 246  150 - 450 K/uL Final    MPV 02/27/2024 11.4  9.4 - 12.3 FL Final    nRBC 02/27/2024 0.00  0.0 - 0.2 K/uL Final    **Note: Absolute NRBC parameter is now reported with Hemogram**    Differential Type 02/27/2024 AUTOMATED    Final    Neutrophils % 02/27/2024 49  43 - 78 % Final    Lymphocytes % 02/27/2024 38  13 - 44 % Final    Monocytes % 02/27/2024 5  4.0 - 12.0 % Final    Eosinophils %

## 2024-04-29 ENCOUNTER — PATIENT MESSAGE (OUTPATIENT)
Dept: PRIMARY CARE CLINIC | Facility: CLINIC | Age: 39
End: 2024-04-29

## 2024-04-29 DIAGNOSIS — K62.5 RECTAL BLEEDING: Primary | ICD-10-CM

## 2024-04-29 NOTE — TELEPHONE ENCOUNTER
From: Ilene Davidson  To: Dr. Kori Karimi  Sent: 4/29/2024 3:35 PM EDT  Subject: Truelicity    I haven’t been able to refill my trulicity for sometime because of the shortage of the medicine. Is there another top strength one you could prescribe while I’m waiting for that one to come back in stock? Currently [facility] has no expected date that they will have it back in. I’ve not had the shot in three weeks.     Thank you!

## 2024-05-01 RX ORDER — TIRZEPATIDE 5 MG/.5ML
5 INJECTION, SOLUTION SUBCUTANEOUS WEEKLY
Qty: 6 ML | Refills: 3 | Status: SHIPPED | OUTPATIENT
Start: 2024-05-01

## 2024-05-03 PROBLEM — K62.5 RECTAL BLEEDING: Status: ACTIVE | Noted: 2024-05-03

## 2024-06-24 ENCOUNTER — PREP FOR PROCEDURE (OUTPATIENT)
Dept: GASTROENTEROLOGY | Age: 39
End: 2024-06-24

## 2024-06-24 ENCOUNTER — OFFICE VISIT (OUTPATIENT)
Age: 39
End: 2024-06-24
Payer: COMMERCIAL

## 2024-06-24 VITALS
OXYGEN SATURATION: 100 % | HEIGHT: 67 IN | HEART RATE: 62 BPM | DIASTOLIC BLOOD PRESSURE: 85 MMHG | SYSTOLIC BLOOD PRESSURE: 136 MMHG | RESPIRATION RATE: 17 BRPM | BODY MASS INDEX: 45.99 KG/M2 | WEIGHT: 293 LBS

## 2024-06-24 DIAGNOSIS — Z87.19 HX OF HIATAL HERNIA: ICD-10-CM

## 2024-06-24 DIAGNOSIS — Z87.19 HISTORY OF HIATAL HERNIA: ICD-10-CM

## 2024-06-24 DIAGNOSIS — R13.19 ESOPHAGEAL DYSPHAGIA: ICD-10-CM

## 2024-06-24 DIAGNOSIS — K62.5 BRBPR (BRIGHT RED BLOOD PER RECTUM): ICD-10-CM

## 2024-06-24 DIAGNOSIS — K62.5 BRBPR (BRIGHT RED BLOOD PER RECTUM): Primary | ICD-10-CM

## 2024-06-24 PROCEDURE — 99203 OFFICE O/P NEW LOW 30 MIN: CPT | Performed by: PHYSICIAN ASSISTANT

## 2024-06-24 PROCEDURE — 3079F DIAST BP 80-89 MM HG: CPT | Performed by: PHYSICIAN ASSISTANT

## 2024-06-24 PROCEDURE — 3075F SYST BP GE 130 - 139MM HG: CPT | Performed by: PHYSICIAN ASSISTANT

## 2024-06-24 NOTE — PATIENT INSTRUCTIONS
For hemorrhoids:   Take Miralax 1 cap daily for constipation and titrate up or down as needed until having regular daily bowel movements. Increase daily fiber intake to 25-35 grams daily either by dietary intake or an over-the-counter psyllium husk fiber supplement. Limit alcohol and fatty food intake. Drink at least 80 oz water daily. Exercise regularly and consider weight loss if appropriate based on your current weight. Avoid straining or lingering on the toilet longer than necessary. Try scheduled toilet time approximately 30 minutes after your first drink or meal of the day. Elevate your feet when toileting to bring your knees in line with your hips using a small stool or Squatty Potty. Review your medication list and discuss with your PCP whether any of these medications may exacerbate constipation. Try warm sitz baths 2-3 times daily for acute hemorrhoidal flare-up. Apply topical or suppository steroid (such as Preparation H or Anusol) for no longer than 7 days. You can also try lidocaine creams (Preparation H + Lidocaine) or Recticare 5% for pain relief. Try witch hazel pads for itching or burning. Keep the anal area clean and dry; avoid excessive or aggressive wiping. Consider wet wipe usage as needed for hygiene and comfort.

## 2024-06-24 NOTE — PROGRESS NOTES
escitalopram (LEXAPRO) 10 MG tablet Take 1 tablet by mouth daily 90 tablet 3    Dulaglutide (TRULICITY) 4.5 MG/0.5ML SOPN Inject 4.5 mg into the skin once a week 12 Adjustable Dose Pre-filled Pen Syringe 3    levocetirizine (XYZAL) 5 MG tablet Take 1 tablet by mouth nightly 90 tablet 3    olmesartan (BENICAR) 20 MG tablet Take 1 tablet by mouth daily 90 tablet 3    furosemide (LASIX) 20 MG tablet Take 1 tablet by mouth 2 times daily as needed (leg swelling) 180 tablet 3    vitamin D (ERGOCALCIFEROL) 1.25 MG (60013 UT) CAPS capsule Take 1 capsule by mouth once a week 12 capsule 3    Budeson-Glycopyrrol-Formoterol (BREZTRI AEROSPHERE) 160-9-4.8 MCG/ACT AERO Inhale 2 puffs into the lungs in the morning and at bedtime 1 each 0     No current facility-administered medications for this visit.       Review of Systems  All other systems reviewed and are negative except as noted above.          Objective     Vitals:    06/24/24 0925   BP: 136/85   Pulse: 62   Resp: 17   SpO2: 100%       Physical Exam  Vitals reviewed.   Constitutional:       General: She is not in acute distress.     Appearance: She is obese. She is not ill-appearing, toxic-appearing or diaphoretic.   Eyes:      General: No scleral icterus.  Cardiovascular:      Rate and Rhythm: Normal rate.      Heart sounds: No murmur heard.  Pulmonary:      Effort: Pulmonary effort is normal. No respiratory distress.      Breath sounds: Normal breath sounds. No wheezing, rhonchi or rales.   Abdominal:      General: There is no distension.      Palpations: Abdomen is soft.      Tenderness: There is no abdominal tenderness. There is no guarding or rebound.   Skin:     General: Skin is warm and dry.      Coloration: Skin is not jaundiced.   Neurological:      General: No focal deficit present.      Mental Status: She is alert and oriented to person, place, and time.   Psychiatric:         Mood and Affect: Mood normal.         Behavior: Behavior normal.         Thought

## 2024-06-25 RX ORDER — SODIUM CHLORIDE 0.9 % (FLUSH) 0.9 %
5-40 SYRINGE (ML) INJECTION PRN
Status: CANCELLED | OUTPATIENT
Start: 2024-06-25

## 2024-06-25 RX ORDER — SODIUM CHLORIDE 0.9 % (FLUSH) 0.9 %
5-40 SYRINGE (ML) INJECTION EVERY 12 HOURS SCHEDULED
Status: CANCELLED | OUTPATIENT
Start: 2024-06-25

## 2024-06-25 RX ORDER — SODIUM CHLORIDE 9 MG/ML
25 INJECTION, SOLUTION INTRAVENOUS PRN
Status: CANCELLED | OUTPATIENT
Start: 2024-06-25

## 2024-07-10 ENCOUNTER — ANESTHESIA EVENT (OUTPATIENT)
Dept: ENDOSCOPY | Age: 39
End: 2024-07-10
Payer: COMMERCIAL

## 2024-07-10 RX ORDER — FENTANYL CITRATE 50 UG/ML
25 INJECTION, SOLUTION INTRAMUSCULAR; INTRAVENOUS EVERY 5 MIN PRN
Status: CANCELLED | OUTPATIENT
Start: 2024-07-10

## 2024-07-10 RX ORDER — ONDANSETRON 2 MG/ML
4 INJECTION INTRAMUSCULAR; INTRAVENOUS
Status: CANCELLED | OUTPATIENT
Start: 2024-07-10 | End: 2024-07-11

## 2024-07-10 RX ORDER — HYDROMORPHONE HYDROCHLORIDE 2 MG/ML
0.5 INJECTION, SOLUTION INTRAMUSCULAR; INTRAVENOUS; SUBCUTANEOUS EVERY 5 MIN PRN
Status: CANCELLED | OUTPATIENT
Start: 2024-07-10

## 2024-07-10 RX ORDER — IBUPROFEN 600 MG/1
1 TABLET ORAL PRN
Status: CANCELLED | OUTPATIENT
Start: 2024-07-10

## 2024-07-10 RX ORDER — PROCHLORPERAZINE EDISYLATE 5 MG/ML
5 INJECTION INTRAMUSCULAR; INTRAVENOUS
Status: CANCELLED | OUTPATIENT
Start: 2024-07-10 | End: 2024-07-11

## 2024-07-10 RX ORDER — FAMOTIDINE 40 MG/1
40 TABLET, FILM COATED ORAL DAILY
COMMUNITY

## 2024-07-10 RX ORDER — SODIUM CHLORIDE 0.9 % (FLUSH) 0.9 %
5-40 SYRINGE (ML) INJECTION PRN
Status: CANCELLED | OUTPATIENT
Start: 2024-07-10

## 2024-07-10 RX ORDER — SODIUM CHLORIDE 0.9 % (FLUSH) 0.9 %
5-40 SYRINGE (ML) INJECTION EVERY 12 HOURS SCHEDULED
Status: CANCELLED | OUTPATIENT
Start: 2024-07-10

## 2024-07-10 RX ORDER — SODIUM CHLORIDE 9 MG/ML
INJECTION, SOLUTION INTRAVENOUS PRN
Status: CANCELLED | OUTPATIENT
Start: 2024-07-10

## 2024-07-10 RX ORDER — NALOXONE HYDROCHLORIDE 0.4 MG/ML
INJECTION, SOLUTION INTRAMUSCULAR; INTRAVENOUS; SUBCUTANEOUS PRN
Status: CANCELLED | OUTPATIENT
Start: 2024-07-10

## 2024-07-10 RX ORDER — MEPERIDINE HYDROCHLORIDE 25 MG/ML
12.5 INJECTION INTRAMUSCULAR; INTRAVENOUS; SUBCUTANEOUS EVERY 5 MIN PRN
Status: CANCELLED | OUTPATIENT
Start: 2024-07-10

## 2024-07-10 RX ORDER — DIPHENHYDRAMINE HYDROCHLORIDE 50 MG/ML
12.5 INJECTION INTRAMUSCULAR; INTRAVENOUS
Status: CANCELLED | OUTPATIENT
Start: 2024-07-10 | End: 2024-07-11

## 2024-07-10 RX ORDER — OXYCODONE HYDROCHLORIDE 5 MG/1
5 TABLET ORAL
Status: CANCELLED | OUTPATIENT
Start: 2024-07-10 | End: 2024-07-11

## 2024-07-10 RX ORDER — SODIUM CHLORIDE, SODIUM LACTATE, POTASSIUM CHLORIDE, CALCIUM CHLORIDE 600; 310; 30; 20 MG/100ML; MG/100ML; MG/100ML; MG/100ML
INJECTION, SOLUTION INTRAVENOUS CONTINUOUS
Status: CANCELLED | OUTPATIENT
Start: 2024-07-10

## 2024-07-10 RX ORDER — DEXTROSE MONOHYDRATE 100 MG/ML
INJECTION, SOLUTION INTRAVENOUS CONTINUOUS PRN
Status: CANCELLED | OUTPATIENT
Start: 2024-07-10

## 2024-07-10 NOTE — PROGRESS NOTES
Patient verified name, , and procedure.    Type: 1a; abbreviated assessment per anesthesia guidelines    Labs per anesthesia: none    Instructed pt that they will be notified the day before their procedure by the GI Lab for time of arrival if their procedure is [place] and Pre-op for [place] cases. Arrival times should be called by 5 pm. If no phone is received the patient should contact their respective hospital. The GI lab telephone number is [phone] and ES Pre-op is [phone].     Follow diet and prep instructions per office including NPO status.      Bath or shower the night before and the am of surgery with non-moisturizing soap. No lotions, oils, powders, cologne on skin. No make up, eye make up or jewelry. Wear loose fitting comfortable, clean clothing.     Must have adult present in building the entire time .     Medications for the day of procedure Lexapro, Pepcid, Xyxal, Singulair, , patient to hold Mounjaro  per anesthesia guidelines.     The following discharge instructions reviewed with patient: medication given during procedure may cause drowsiness for several hours, therefore, do not drive or operate machinery for remainder of the day. You may not drink alcohol on the day of your procedure, please resume regular diet and activity unless otherwise directed. You may experience abdominal distention for several hours that is relieved by the passage of gas. Contact your physician if you have any of the following: fever or chills, severe abdominal pain or excessive amount of bleeding or a large amount when having a bowel movement. Occasional specks of blood with bowel movement would not be unusual.

## 2024-07-10 NOTE — PROGRESS NOTES
Patient is instructed to have clear liquids only on the day prior to procedure and to be NPO after midnight, unless otherwise indicated, including gum, mints, and ice chips. Pt last dose of Mounjaro was 2 weeks ago.

## 2024-07-11 ENCOUNTER — HOSPITAL ENCOUNTER (OUTPATIENT)
Age: 39
Setting detail: OUTPATIENT SURGERY
Discharge: HOME | End: 2024-07-11
Attending: STUDENT IN AN ORGANIZED HEALTH CARE EDUCATION/TRAINING PROGRAM | Admitting: STUDENT IN AN ORGANIZED HEALTH CARE EDUCATION/TRAINING PROGRAM
Payer: COMMERCIAL

## 2024-07-11 ENCOUNTER — ANESTHESIA (OUTPATIENT)
Dept: ENDOSCOPY | Age: 39
End: 2024-07-11
Payer: COMMERCIAL

## 2024-07-11 VITALS
RESPIRATION RATE: 16 BRPM | DIASTOLIC BLOOD PRESSURE: 83 MMHG | HEART RATE: 68 BPM | OXYGEN SATURATION: 97 % | TEMPERATURE: 97.6 F | WEIGHT: 293 LBS | SYSTOLIC BLOOD PRESSURE: 127 MMHG | HEIGHT: 67 IN | BODY MASS INDEX: 45.99 KG/M2

## 2024-07-11 DIAGNOSIS — K44.9 HIATAL HERNIA: Primary | ICD-10-CM

## 2024-07-11 LAB
GLUCOSE BLD STRIP.AUTO-MCNC: 85 MG/DL (ref 65–100)
POTASSIUM BLD-SCNC: 3.9 MMOL/L (ref 3.5–5.1)
SERVICE CMNT-IMP: NORMAL

## 2024-07-11 PROCEDURE — 82962 GLUCOSE BLOOD TEST: CPT

## 2024-07-11 PROCEDURE — 7100000000 HC PACU RECOVERY - FIRST 15 MIN: Performed by: STUDENT IN AN ORGANIZED HEALTH CARE EDUCATION/TRAINING PROGRAM

## 2024-07-11 PROCEDURE — 88305 TISSUE EXAM BY PATHOLOGIST: CPT

## 2024-07-11 PROCEDURE — 3700000000 HC ANESTHESIA ATTENDED CARE: Performed by: STUDENT IN AN ORGANIZED HEALTH CARE EDUCATION/TRAINING PROGRAM

## 2024-07-11 PROCEDURE — 2500000003 HC RX 250 WO HCPCS: Performed by: NURSE ANESTHETIST, CERTIFIED REGISTERED

## 2024-07-11 PROCEDURE — 3609027000 HC COLONOSCOPY: Performed by: STUDENT IN AN ORGANIZED HEALTH CARE EDUCATION/TRAINING PROGRAM

## 2024-07-11 PROCEDURE — 2580000003 HC RX 258: Performed by: ANESTHESIOLOGY

## 2024-07-11 PROCEDURE — C1769 GUIDE WIRE: HCPCS | Performed by: STUDENT IN AN ORGANIZED HEALTH CARE EDUCATION/TRAINING PROGRAM

## 2024-07-11 PROCEDURE — 84132 ASSAY OF SERUM POTASSIUM: CPT

## 2024-07-11 PROCEDURE — 3700000001 HC ADD 15 MINUTES (ANESTHESIA): Performed by: STUDENT IN AN ORGANIZED HEALTH CARE EDUCATION/TRAINING PROGRAM

## 2024-07-11 PROCEDURE — 7100000011 HC PHASE II RECOVERY - ADDTL 15 MIN: Performed by: STUDENT IN AN ORGANIZED HEALTH CARE EDUCATION/TRAINING PROGRAM

## 2024-07-11 PROCEDURE — 6360000002 HC RX W HCPCS: Performed by: NURSE ANESTHETIST, CERTIFIED REGISTERED

## 2024-07-11 PROCEDURE — 3609012700 HC EGD DILATION SAVORY: Performed by: STUDENT IN AN ORGANIZED HEALTH CARE EDUCATION/TRAINING PROGRAM

## 2024-07-11 PROCEDURE — 2709999900 HC NON-CHARGEABLE SUPPLY: Performed by: STUDENT IN AN ORGANIZED HEALTH CARE EDUCATION/TRAINING PROGRAM

## 2024-07-11 PROCEDURE — 7100000010 HC PHASE II RECOVERY - FIRST 15 MIN: Performed by: STUDENT IN AN ORGANIZED HEALTH CARE EDUCATION/TRAINING PROGRAM

## 2024-07-11 PROCEDURE — 7100000001 HC PACU RECOVERY - ADDTL 15 MIN: Performed by: STUDENT IN AN ORGANIZED HEALTH CARE EDUCATION/TRAINING PROGRAM

## 2024-07-11 RX ORDER — SODIUM CHLORIDE 0.9 % (FLUSH) 0.9 %
5-40 SYRINGE (ML) INJECTION PRN
Status: DISCONTINUED | OUTPATIENT
Start: 2024-07-11 | End: 2024-07-11 | Stop reason: HOSPADM

## 2024-07-11 RX ORDER — OMEPRAZOLE 40 MG/1
CAPSULE, DELAYED RELEASE ORAL
Qty: 150 CAPSULE | Refills: 0 | Status: SHIPPED | OUTPATIENT
Start: 2024-07-11 | End: 2024-10-09

## 2024-07-11 RX ORDER — SODIUM CHLORIDE 9 MG/ML
25 INJECTION, SOLUTION INTRAVENOUS PRN
Status: DISCONTINUED | OUTPATIENT
Start: 2024-07-11 | End: 2024-07-11 | Stop reason: HOSPADM

## 2024-07-11 RX ORDER — SODIUM CHLORIDE 0.9 % (FLUSH) 0.9 %
5-40 SYRINGE (ML) INJECTION EVERY 12 HOURS SCHEDULED
Status: DISCONTINUED | OUTPATIENT
Start: 2024-07-11 | End: 2024-07-11 | Stop reason: HOSPADM

## 2024-07-11 RX ORDER — LIDOCAINE HYDROCHLORIDE 10 MG/ML
1 INJECTION, SOLUTION INFILTRATION; PERINEURAL
Status: DISCONTINUED | OUTPATIENT
Start: 2024-07-11 | End: 2024-07-11 | Stop reason: HOSPADM

## 2024-07-11 RX ORDER — LIDOCAINE HYDROCHLORIDE 20 MG/ML
INJECTION, SOLUTION EPIDURAL; INFILTRATION; INTRACAUDAL; PERINEURAL PRN
Status: DISCONTINUED | OUTPATIENT
Start: 2024-07-11 | End: 2024-07-11 | Stop reason: SDUPTHER

## 2024-07-11 RX ORDER — GLYCOPYRROLATE 0.2 MG/ML
INJECTION INTRAMUSCULAR; INTRAVENOUS PRN
Status: DISCONTINUED | OUTPATIENT
Start: 2024-07-11 | End: 2024-07-11 | Stop reason: SDUPTHER

## 2024-07-11 RX ORDER — SODIUM CHLORIDE 9 MG/ML
INJECTION, SOLUTION INTRAVENOUS PRN
Status: DISCONTINUED | OUTPATIENT
Start: 2024-07-11 | End: 2024-07-11 | Stop reason: HOSPADM

## 2024-07-11 RX ORDER — PROPOFOL 10 MG/ML
INJECTION, EMULSION INTRAVENOUS PRN
Status: DISCONTINUED | OUTPATIENT
Start: 2024-07-11 | End: 2024-07-11 | Stop reason: SDUPTHER

## 2024-07-11 RX ORDER — SODIUM CHLORIDE, SODIUM LACTATE, POTASSIUM CHLORIDE, CALCIUM CHLORIDE 600; 310; 30; 20 MG/100ML; MG/100ML; MG/100ML; MG/100ML
INJECTION, SOLUTION INTRAVENOUS CONTINUOUS
Status: DISCONTINUED | OUTPATIENT
Start: 2024-07-11 | End: 2024-07-11 | Stop reason: HOSPADM

## 2024-07-11 RX ADMIN — GLYCOPYRROLATE 0.1 MG: 0.2 INJECTION INTRAMUSCULAR; INTRAVENOUS at 07:16

## 2024-07-11 RX ADMIN — PROPOFOL 70 MG: 10 INJECTION, EMULSION INTRAVENOUS at 07:16

## 2024-07-11 RX ADMIN — LIDOCAINE HYDROCHLORIDE 100 MG: 20 INJECTION, SOLUTION EPIDURAL; INFILTRATION; INTRACAUDAL; PERINEURAL at 07:16

## 2024-07-11 RX ADMIN — PROPOFOL 180 MCG/KG/MIN: 10 INJECTION, EMULSION INTRAVENOUS at 07:18

## 2024-07-11 RX ADMIN — PROPOFOL 30 MG: 10 INJECTION, EMULSION INTRAVENOUS at 07:23

## 2024-07-11 RX ADMIN — SODIUM CHLORIDE, POTASSIUM CHLORIDE, SODIUM LACTATE AND CALCIUM CHLORIDE: 600; 310; 30; 20 INJECTION, SOLUTION INTRAVENOUS at 06:04

## 2024-07-11 RX ADMIN — PROPOFOL 30 MG: 10 INJECTION, EMULSION INTRAVENOUS at 07:17

## 2024-07-11 RX ADMIN — PROPOFOL 30 MG: 10 INJECTION, EMULSION INTRAVENOUS at 07:19

## 2024-07-11 ASSESSMENT — PAIN - FUNCTIONAL ASSESSMENT: PAIN_FUNCTIONAL_ASSESSMENT: 0-10

## 2024-07-11 NOTE — H&P
Ilene Davidson (:  1985) is a 38 y.o. female new patient referred to our office for evaluation of the following chief complaint(s):  New Patient (Rectal bleeding/)              Assessment & Plan  ASSESSMENT/PLAN:  1. BRBPR (bright red blood per rectum)  2. Esophageal dysphagia  3. Hx of hiatal hernia           -Schedule colonoscopy for hx diarrhea and fairly large volume BRBPR. This has stopped since discontinuing weight loss injections and Adipex approximately 3 weeks ago. R/o IBD, polyps, evaluate for hemorrhoids, etc.   -Also with self-reported hx of remote EGD which showed large hiatal hernia, unable to have gastric sleeve surgery. C/o ongoing pill and solid food dysphagia. Multiple family members with hx of esophageal dilatation.        Subjective   SUBJECTIVE/OBJECTIVE  Ilene Davidson is a 38 y.o. year old female with PMH pertinent for HTN, MO with BMI 57.45, NIDDM, chronic cough, anxiety. Surgical history is pertinent for  x 2, salpingo-oophorectomy.      Patient was referred to our office for evaluation of rectal bleeding. Referral note reviewed from 5/3/2024.     Today patient reports she's been trying to lose weight - initially with Trulicity until it went out of stock. Had great success with Adipex in the past and was prescribed this also. She has been having diarrhea which she thinks is a medication side effect. In May she experienced the worst of her bleeding with large volume BRBPR in toilet bowl and clots. She stopped Adipex immediately and then stopped weight loss injections 2 weeks ago. She hasn't had any bleeding in the past 3 weeks. No longer having any diarrhea. Denies nausea, vomiting, fever, chills, abdominal pain or cramping, melena. She has had some rectal pain which she attributes to frequent diarrhea. She has a hx of internal hemorrhoids which only bothered her during remote pregnancies.      She takes Pepcid 20 mg OTC daily for heartburn symptoms  Abena

## 2024-07-11 NOTE — ANESTHESIA PRE PROCEDURE
Department of Anesthesiology  Preprocedure Note       Name:  Ilene Davidson   Age:  38 y.o.  :  1985                                          MRN:  [id number]         Date:  2024      Surgeon: Surgeon(s):  Julio Hurtado MD    Procedure: Procedure(s):  COLORECTAL CANCER SCREENING, NOT HIGH RISK  (request 0700 flouro room)  ESOPHAGOGASTRODUODENOSCOPY    Medications prior to admission:   Prior to Admission medications    Medication Sig Start Date End Date Taking? Authorizing Provider   famotidine (PEPCID) 40 MG tablet Take 1 tablet by mouth daily   Yes Provider, MD Rachel   Tirzepatide (MOUNJARO) 5 MG/0.5ML SOPN SC injection Inject 0.5 mLs into the skin once a week 24   Kori Karimi MD   montelukast (SINGULAIR) 10 MG tablet Take 1 tablet by mouth daily 3/4/24   Kori Karimi MD   escitalopram (LEXAPRO) 10 MG tablet Take 1 tablet by mouth daily 3/4/24   Kori Karimi MD   levocetirizine (XYZAL) 5 MG tablet Take 1 tablet by mouth nightly 3/4/24   Kori Karimi MD   olmesartan (BENICAR) 20 MG tablet Take 1 tablet by mouth daily 3/4/24   Kori Karimi MD   furosemide (LASIX) 20 MG tablet Take 1 tablet by mouth 2 times daily as needed (leg swelling) 3/4/24   Kori Karimi MD   vitamin D (ERGOCALCIFEROL) 1.25 MG (51339 UT) CAPS capsule Take 1 capsule by mouth once a week 3/4/24   Kori Karimi MD   Budeson-Glycopyrrol-Formoterol (BREZTRI AEROSPHERE) 160-9-4.8 MCG/ACT AERO Inhale 2 puffs into the lungs in the morning and at bedtime 10/24/23   Kori Karimi MD       Current medications:    Current Facility-Administered Medications   Medication Dose Route Frequency Provider Last Rate Last Admin   • lidocaine 1 % injection 1 mL  1 mL IntraDERmal Once PRN Velia Eugene MD       • lactated ringers IV soln infusion   IntraVENous Continuous Velia Eugene  mL/hr at 24 0604

## 2024-07-11 NOTE — OP NOTE
Endoscopy Note          Operative Report    Patient: Ilene Davidson MRN: [id number]      YOB: 1985  Age: 38 y.o.  Sex: female            Indications:  Dysphagia    Preoperative Evaluation: The patient was evaluated prior to the procedure in the GI lab admission area, the patient ASA was recorded .  Consent was obtained from the patient with the risk of perforation bleeding and aspiration.    Anesthesia: HANSA-per anesthesia  Complications: None  EBL: Unremarkable  Procedure: The patient was sedated in the left lateral decubitus position. Scope was advance from the mouth to the third portion of the duodenum. The scope was withdrawn to the stomach and a refroflexed view was performed.     Findings:  Esophagus appeared to have furrows and rings that could suggest Eosinophilic esophagitis, diameter appeared to be normal, biopsies obtained from esophagus for EoE evaluation.  Empiric dilation was done with 51 Fr Savary, no mucosal rent on postdilation exam.   Small hiatal hernia (1 cm), Hill type 1, GE junction had irregularity, biopsies obtained for Lynne's esophagus.   Normal stomach and duodenum.     Postoperative Diagnosis:  Hiatal hernia    Recommendations:   Start using omeprazole 40 mg twice daily (30 min before breakfast and dinner) for 8 weeks followed by 40 mg daily.   Follow up with Bing in the clinic in 3-4 weeks.   Await for biopsy results, you will receive a pathology letter within 2 weeks.     Signed By:  Julio Hurtado MD     July 11, 2024

## 2024-07-11 NOTE — ANESTHESIA POSTPROCEDURE EVALUATION
Department of Anesthesiology  Postprocedure Note    Patient: Ilene Davidson  MRN: [id number]  YOB: 1985  Date of evaluation: 7/11/2024    Procedure Summary       Date: 07/11/24 Room / Location: [facility] ENDO FLOURO 1 / [facility] ENDOSCOPY    Anesthesia Start: 0709 Anesthesia Stop: 0747    Procedures:       COLORECTAL CANCER SCREENING, NOT HIGH RISK      ESOPHAGOGASTRODUODENOSCOPY DILATION SAVORY and biopsy (Upper GI Region) Diagnosis:       BRBPR (bright red blood per rectum)      Esophageal dysphagia      History of hiatal hernia      BMI 50.0-59.9, adult (HCC)      (BRBPR (bright red blood per rectum) [K62.5])      (Esophageal dysphagia [R13.19])      (History of hiatal hernia [Z87.19])      (BMI 50.0-59.9, adult (HCC) [Z68.43])    Surgeons: Julio Hurtado MD Responsible Provider: Velia Eugene MD    Anesthesia Type: TIVA ASA Status: 3            Anesthesia Type: No value filed.    Jersey Phase I: Jersey Score: 9    Jersey Phase II: Jersey Score: 10    Anesthesia Post Evaluation    Patient location during evaluation: PACU  Patient participation: complete - patient participated  Level of consciousness: awake and alert  Pain score: 0  Airway patency: patent  Nausea & Vomiting: no nausea and no vomiting  Cardiovascular status: hemodynamically stable  Respiratory status: acceptable  Hydration status: euvolemic  Multimodal analgesia pain management approach  Pain management: adequate    No notable events documented.

## 2024-07-11 NOTE — DISCHARGE INSTRUCTIONS
contact your doctor if:  Your throat still hurts after a day or two.  You do not get better as expected.      Gastrointestinal Colonoscopy/Flexible Sigmoidoscopy - Lower Exam Discharge Instructions  Call Dr. Hurtado for any problems or questions.  Contact the doctor’s office for follow up appointment as directed  Medication may cause drowsiness for several hours, therefore, do not drive or operate machinery for remainder of the day.  No alcohol today.  Do not make any important decisions such signing legal paperwork.  Ordinarily, you may resume regular diet and activity after exam unless otherwise specified by your physician.  Because of air put into your colon during exam, you may experience some abdominal distension, relieved by the passage of gas, for several hours.  Contact your physician if you have any of the following:  Excessive amount of bleeding - large amount when having a bowel movement.  Occasional specks of blood with bowel movement would not be unusual.  Severe abdominal pain  Fever or Chills  Polyp Removal - follow these additional instructions  Clear liquid diet for the next meal (jell-o, broth, clear drinks)  Soft diet for 24 hours, then resume regular diet   Take Metamucil - 1 tablespoon in juice every morning for 3 days, if needed.  No Aspirin, Advil, Aleve, Nuprin, Ibuprofen, or medications that contain these drugs for 2 weeks, unless taken for a heart condition.    Instructions given to Ilene Davidson and other family members.     After general anesthesia or intravenous sedation, for 24 hours or while taking prescription Narcotics:  Limit your activities  A responsible adult needs to be with you for the next 24 hours  Do not drive and operate hazardous machinery  Do not make important personal or business decisions  Do not drink alcoholic beverages  If you have not urinated within 8 hours after discharge, and you are experiencing discomfort from urinary retention, please go to the

## 2024-08-02 DIAGNOSIS — K44.9 HIATAL HERNIA: ICD-10-CM

## 2024-08-05 RX ORDER — OMEPRAZOLE 40 MG/1
40 CAPSULE, DELAYED RELEASE ORAL DAILY
Qty: 30 CAPSULE | Refills: 3 | Status: SHIPPED | OUTPATIENT
Start: 2024-08-05 | End: 2024-12-03

## 2024-09-03 ENCOUNTER — LAB (OUTPATIENT)
Dept: PRIMARY CARE CLINIC | Facility: CLINIC | Age: 39
End: 2024-09-03

## 2024-09-03 DIAGNOSIS — Z79.899 ENCOUNTER FOR LONG-TERM (CURRENT) USE OF MEDICATIONS: ICD-10-CM

## 2024-09-03 DIAGNOSIS — E55.9 VITAMIN D DEFICIENCY: ICD-10-CM

## 2024-09-03 DIAGNOSIS — R73.03 PRE-DIABETES: Primary | ICD-10-CM

## 2024-09-03 LAB
25(OH)D3 SERPL-MCNC: 26.2 NG/ML (ref 30–100)
ALBUMIN SERPL-MCNC: 3.7 G/DL (ref 3.5–5)
ALBUMIN/GLOB SERPL: 1.3 (ref 1–1.9)
ALP SERPL-CCNC: 52 U/L (ref 35–104)
ALT SERPL-CCNC: 18 U/L (ref 12–65)
ANION GAP SERPL CALC-SCNC: 13 MMOL/L (ref 9–18)
AST SERPL-CCNC: 19 U/L (ref 15–37)
BASOPHILS # BLD: 0 K/UL (ref 0–0.2)
BASOPHILS NFR BLD: 0 % (ref 0–2)
BILIRUB SERPL-MCNC: 0.3 MG/DL (ref 0–1.2)
BUN SERPL-MCNC: 11 MG/DL (ref 6–23)
CALCIUM SERPL-MCNC: 9 MG/DL (ref 8.8–10.2)
CHLORIDE SERPL-SCNC: 102 MMOL/L (ref 98–107)
CHOLEST SERPL-MCNC: 155 MG/DL (ref 0–200)
CO2 SERPL-SCNC: 24 MMOL/L (ref 20–28)
CREAT SERPL-MCNC: 0.58 MG/DL (ref 0.6–1.1)
DIFFERENTIAL METHOD BLD: ABNORMAL
EOSINOPHIL # BLD: 0.4 K/UL (ref 0–0.8)
EOSINOPHIL NFR BLD: 5 % (ref 0.5–7.8)
ERYTHROCYTE [DISTWIDTH] IN BLOOD BY AUTOMATED COUNT: 15.8 % (ref 11.9–14.6)
EST. AVERAGE GLUCOSE BLD GHB EST-MCNC: 111 MG/DL
GLOBULIN SER CALC-MCNC: 2.9 G/DL (ref 2.3–3.5)
GLUCOSE SERPL-MCNC: 82 MG/DL (ref 70–99)
HBA1C MFR BLD: 5.5 % (ref 0–5.6)
HCT VFR BLD AUTO: 36 % (ref 35.8–46.3)
HDLC SERPL-MCNC: 55 MG/DL (ref 40–60)
HDLC SERPL: 2.8 (ref 0–5)
HGB BLD-MCNC: 11.3 G/DL (ref 11.7–15.4)
IMM GRANULOCYTES # BLD AUTO: 0 K/UL (ref 0–0.5)
IMM GRANULOCYTES NFR BLD AUTO: 0 % (ref 0–5)
LDLC SERPL CALC-MCNC: 78 MG/DL (ref 0–100)
LYMPHOCYTES # BLD: 2.6 K/UL (ref 0.5–4.6)
LYMPHOCYTES NFR BLD: 33 % (ref 13–44)
MCH RBC QN AUTO: 26.4 PG (ref 26.1–32.9)
MCHC RBC AUTO-ENTMCNC: 31.4 G/DL (ref 31.4–35)
MCV RBC AUTO: 84.1 FL (ref 82–102)
MONOCYTES # BLD: 0.4 K/UL (ref 0.1–1.3)
MONOCYTES NFR BLD: 5 % (ref 4–12)
NEUTS SEG # BLD: 4.4 K/UL (ref 1.7–8.2)
NEUTS SEG NFR BLD: 57 % (ref 43–78)
NRBC # BLD: 0 K/UL (ref 0–0.2)
PLATELET # BLD AUTO: 246 K/UL (ref 150–450)
PMV BLD AUTO: 11.2 FL (ref 9.4–12.3)
POTASSIUM SERPL-SCNC: 3.9 MMOL/L (ref 3.5–5.1)
PROT SERPL-MCNC: 6.6 G/DL (ref 6.3–8.2)
RBC # BLD AUTO: 4.28 M/UL (ref 4.05–5.2)
SODIUM SERPL-SCNC: 138 MMOL/L (ref 136–145)
TRIGL SERPL-MCNC: 110 MG/DL (ref 0–150)
VLDLC SERPL CALC-MCNC: 22 MG/DL (ref 6–23)
WBC # BLD AUTO: 7.9 K/UL (ref 4.3–11.1)

## 2024-09-05 ENCOUNTER — OFFICE VISIT (OUTPATIENT)
Dept: PRIMARY CARE CLINIC | Facility: CLINIC | Age: 39
End: 2024-09-05
Payer: COMMERCIAL

## 2024-09-05 VITALS
SYSTOLIC BLOOD PRESSURE: 132 MMHG | WEIGHT: 293 LBS | OXYGEN SATURATION: 97 % | TEMPERATURE: 97.9 F | HEIGHT: 67 IN | HEART RATE: 68 BPM | DIASTOLIC BLOOD PRESSURE: 88 MMHG | BODY MASS INDEX: 45.99 KG/M2

## 2024-09-05 DIAGNOSIS — I10 ESSENTIAL HYPERTENSION: ICD-10-CM

## 2024-09-05 DIAGNOSIS — Z79.899 ENCOUNTER FOR LONG-TERM (CURRENT) USE OF MEDICATIONS: ICD-10-CM

## 2024-09-05 DIAGNOSIS — H66.001 NON-RECURRENT ACUTE SUPPURATIVE OTITIS MEDIA OF RIGHT EAR WITHOUT SPONTANEOUS RUPTURE OF TYMPANIC MEMBRANE: ICD-10-CM

## 2024-09-05 DIAGNOSIS — R63.2 BINGE EATING: ICD-10-CM

## 2024-09-05 DIAGNOSIS — E66.01 MORBID OBESITY WITH BMI OF 60.0-69.9, ADULT (HCC): Primary | ICD-10-CM

## 2024-09-05 PROBLEM — R05.3 CHRONIC COUGH: Status: RESOLVED | Noted: 2022-12-27 | Resolved: 2024-09-05

## 2024-09-05 PROBLEM — R09.82 POST-NASAL DRAINAGE: Status: RESOLVED | Noted: 2022-12-27 | Resolved: 2024-09-05

## 2024-09-05 PROCEDURE — 3075F SYST BP GE 130 - 139MM HG: CPT | Performed by: FAMILY MEDICINE

## 2024-09-05 PROCEDURE — 3079F DIAST BP 80-89 MM HG: CPT | Performed by: FAMILY MEDICINE

## 2024-09-05 PROCEDURE — 99213 OFFICE O/P EST LOW 20 MIN: CPT | Performed by: FAMILY MEDICINE

## 2024-09-05 RX ORDER — TIRZEPATIDE 10 MG/.5ML
10 INJECTION, SOLUTION SUBCUTANEOUS WEEKLY
Qty: 4 ADJUSTABLE DOSE PRE-FILLED PEN SYRINGE | Refills: 5 | Status: SHIPPED | OUTPATIENT
Start: 2024-09-05

## 2024-09-05 RX ORDER — AMOXICILLIN AND CLAVULANATE POTASSIUM 500; 125 MG/1; MG/1
1 TABLET, FILM COATED ORAL 2 TIMES DAILY
Qty: 20 TABLET | Refills: 0 | Status: SHIPPED | OUTPATIENT
Start: 2024-09-05 | End: 2024-09-15

## 2024-09-05 RX ORDER — FLUCONAZOLE 150 MG/1
150 TABLET ORAL WEEKLY
Qty: 2 TABLET | Refills: 0 | Status: SHIPPED | OUTPATIENT
Start: 2024-09-05 | End: 2024-09-13

## 2024-09-05 SDOH — ECONOMIC STABILITY: TRANSPORTATION INSECURITY
IN THE PAST 12 MONTHS, HAS LACK OF TRANSPORTATION KEPT YOU FROM MEETINGS, WORK, OR FROM GETTING THINGS NEEDED FOR DAILY LIVING?: NO

## 2024-09-05 SDOH — ECONOMIC STABILITY: INCOME INSECURITY: HOW HARD IS IT FOR YOU TO PAY FOR THE VERY BASICS LIKE FOOD, HOUSING, MEDICAL CARE, AND HEATING?: NOT HARD AT ALL

## 2024-09-05 SDOH — ECONOMIC STABILITY: FOOD INSECURITY: WITHIN THE PAST 12 MONTHS, THE FOOD YOU BOUGHT JUST DIDN'T LAST AND YOU DIDN'T HAVE MONEY TO GET MORE.: NEVER TRUE

## 2024-09-05 SDOH — ECONOMIC STABILITY: FOOD INSECURITY: WITHIN THE PAST 12 MONTHS, YOU WORRIED THAT YOUR FOOD WOULD RUN OUT BEFORE YOU GOT MONEY TO BUY MORE.: NEVER TRUE

## 2024-12-11 DIAGNOSIS — K44.9 HIATAL HERNIA: ICD-10-CM

## 2024-12-11 RX ORDER — OMEPRAZOLE 40 MG/1
CAPSULE, DELAYED RELEASE ORAL DAILY
Qty: 30 CAPSULE | Refills: 3 | Status: SHIPPED | OUTPATIENT
Start: 2024-12-11

## 2025-02-28 DIAGNOSIS — R79.89 OTHER SPECIFIED ABNORMAL FINDINGS OF BLOOD CHEMISTRY: ICD-10-CM

## 2025-02-28 DIAGNOSIS — R53.81 MALAISE: ICD-10-CM

## 2025-02-28 DIAGNOSIS — Z13.228 SCREENING FOR ENDOCRINE, NUTRITIONAL, METABOLIC AND IMMUNITY DISORDER: ICD-10-CM

## 2025-02-28 DIAGNOSIS — Z13.0 SCREENING FOR DEFICIENCY ANEMIA: ICD-10-CM

## 2025-02-28 DIAGNOSIS — Z13.228 SCREENING FOR METABOLIC DISORDER: ICD-10-CM

## 2025-02-28 DIAGNOSIS — Z13.21 ENCOUNTER FOR VITAMIN DEFICIENCY SCREENING: ICD-10-CM

## 2025-02-28 DIAGNOSIS — E55.9 VITAMIN D DEFICIENCY: ICD-10-CM

## 2025-02-28 DIAGNOSIS — Z13.0 SCREENING FOR ENDOCRINE, NUTRITIONAL, METABOLIC AND IMMUNITY DISORDER: ICD-10-CM

## 2025-02-28 DIAGNOSIS — Z13.21 SCREENING FOR ENDOCRINE, NUTRITIONAL, METABOLIC AND IMMUNITY DISORDER: ICD-10-CM

## 2025-02-28 DIAGNOSIS — Z79.899 ENCOUNTER FOR LONG-TERM (CURRENT) USE OF MEDICATIONS: ICD-10-CM

## 2025-02-28 DIAGNOSIS — R53.82 CHRONIC FATIGUE: ICD-10-CM

## 2025-02-28 DIAGNOSIS — Z13.1 SCREENING FOR DIABETES MELLITUS: ICD-10-CM

## 2025-02-28 DIAGNOSIS — Z13.29 SCREENING FOR ENDOCRINE, NUTRITIONAL, METABOLIC AND IMMUNITY DISORDER: ICD-10-CM

## 2025-02-28 DIAGNOSIS — R73.09 OTHER ABNORMAL GLUCOSE: ICD-10-CM

## 2025-02-28 DIAGNOSIS — Z13.220 SCREENING FOR LIPID DISORDERS: ICD-10-CM

## 2025-02-28 DIAGNOSIS — R79.9 ABNORMAL BLOOD CHEMISTRY: ICD-10-CM

## 2025-02-28 LAB
25(OH)D3 SERPL-MCNC: 27.1 NG/ML (ref 30–100)
ALBUMIN SERPL-MCNC: 3.5 G/DL (ref 3.5–5)
ALBUMIN/GLOB SERPL: 1.1 (ref 1–1.9)
ALP SERPL-CCNC: 62 U/L (ref 35–104)
ALT SERPL-CCNC: 20 U/L (ref 8–45)
ANION GAP SERPL CALC-SCNC: 13 MMOL/L (ref 7–16)
AST SERPL-CCNC: 18 U/L (ref 15–37)
BASOPHILS # BLD: 0.04 K/UL (ref 0–0.2)
BASOPHILS NFR BLD: 0.6 % (ref 0–2)
BILIRUB SERPL-MCNC: 0.2 MG/DL (ref 0–1.2)
BUN SERPL-MCNC: 16 MG/DL (ref 6–23)
CALCIUM SERPL-MCNC: 9.1 MG/DL (ref 8.8–10.2)
CHLORIDE SERPL-SCNC: 102 MMOL/L (ref 98–107)
CHOLEST SERPL-MCNC: 152 MG/DL (ref 0–200)
CO2 SERPL-SCNC: 24 MMOL/L (ref 20–29)
CREAT SERPL-MCNC: 0.61 MG/DL (ref 0.6–1.1)
DIFFERENTIAL METHOD BLD: ABNORMAL
EOSINOPHIL # BLD: 0.36 K/UL (ref 0–0.8)
EOSINOPHIL NFR BLD: 5.5 % (ref 0.5–7.8)
ERYTHROCYTE [DISTWIDTH] IN BLOOD BY AUTOMATED COUNT: 15.5 % (ref 11.9–14.6)
EST. AVERAGE GLUCOSE BLD GHB EST-MCNC: 109 MG/DL
GLOBULIN SER CALC-MCNC: 3.3 G/DL (ref 2.3–3.5)
GLUCOSE SERPL-MCNC: 84 MG/DL (ref 70–99)
HBA1C MFR BLD: 5.4 % (ref 0–5.6)
HCT VFR BLD AUTO: 36.8 % (ref 35.8–46.3)
HDLC SERPL-MCNC: 54 MG/DL (ref 40–60)
HDLC SERPL: 2.8 (ref 0–5)
HGB BLD-MCNC: 11.4 G/DL (ref 11.7–15.4)
IMM GRANULOCYTES # BLD AUTO: 0.02 K/UL (ref 0–0.5)
IMM GRANULOCYTES NFR BLD AUTO: 0.3 % (ref 0–5)
LDLC SERPL CALC-MCNC: 80 MG/DL (ref 0–100)
LYMPHOCYTES # BLD: 2.18 K/UL (ref 0.5–4.6)
LYMPHOCYTES NFR BLD: 33.1 % (ref 13–44)
MCH RBC QN AUTO: 24.8 PG (ref 26.1–32.9)
MCHC RBC AUTO-ENTMCNC: 31 G/DL (ref 31.4–35)
MCV RBC AUTO: 80 FL (ref 82–102)
MONOCYTES # BLD: 0.38 K/UL (ref 0.1–1.3)
MONOCYTES NFR BLD: 5.8 % (ref 4–12)
NEUTS SEG # BLD: 3.6 K/UL (ref 1.7–8.2)
NEUTS SEG NFR BLD: 54.7 % (ref 43–78)
NRBC # BLD: 0 K/UL (ref 0–0.2)
PLATELET # BLD AUTO: 238 K/UL (ref 150–450)
PMV BLD AUTO: 12.2 FL (ref 9.4–12.3)
POTASSIUM SERPL-SCNC: 4.8 MMOL/L (ref 3.5–5.1)
PROT SERPL-MCNC: 6.8 G/DL (ref 6.3–8.2)
RBC # BLD AUTO: 4.6 M/UL (ref 4.05–5.2)
SODIUM SERPL-SCNC: 139 MMOL/L (ref 136–145)
TRIGL SERPL-MCNC: 91 MG/DL (ref 0–150)
VLDLC SERPL CALC-MCNC: 18 MG/DL (ref 6–23)
WBC # BLD AUTO: 6.6 K/UL (ref 4.3–11.1)

## 2025-03-07 DIAGNOSIS — E55.9 VITAMIN D DEFICIENCY: ICD-10-CM

## 2025-03-07 RX ORDER — ERGOCALCIFEROL 1.25 MG/1
50000 CAPSULE, LIQUID FILLED ORAL WEEKLY
Qty: 12 CAPSULE | Refills: 3 | OUTPATIENT
Start: 2025-03-07

## 2025-03-10 DIAGNOSIS — I10 ESSENTIAL HYPERTENSION: ICD-10-CM

## 2025-03-10 DIAGNOSIS — M79.89 LEG SWELLING: ICD-10-CM

## 2025-03-10 DIAGNOSIS — R09.82 POST-NASAL DRAINAGE: ICD-10-CM

## 2025-03-10 DIAGNOSIS — R05.3 CHRONIC COUGH: ICD-10-CM

## 2025-03-10 RX ORDER — FUROSEMIDE 20 MG/1
20 TABLET ORAL 2 TIMES DAILY PRN
Qty: 180 TABLET | Refills: 3 | OUTPATIENT
Start: 2025-03-10

## 2025-03-10 RX ORDER — LEVOCETIRIZINE DIHYDROCHLORIDE 5 MG/1
5 TABLET, FILM COATED ORAL NIGHTLY
Qty: 90 TABLET | Refills: 3 | OUTPATIENT
Start: 2025-03-10

## 2025-03-10 RX ORDER — OLMESARTAN MEDOXOMIL 20 MG/1
20 TABLET ORAL DAILY
Qty: 90 TABLET | Refills: 3 | OUTPATIENT
Start: 2025-03-10

## 2025-03-14 ENCOUNTER — OFFICE VISIT (OUTPATIENT)
Dept: PRIMARY CARE CLINIC | Facility: CLINIC | Age: 40
End: 2025-03-14
Payer: COMMERCIAL

## 2025-03-14 VITALS
WEIGHT: 293 LBS | OXYGEN SATURATION: 99 % | TEMPERATURE: 97.7 F | SYSTOLIC BLOOD PRESSURE: 134 MMHG | HEART RATE: 62 BPM | HEIGHT: 67 IN | DIASTOLIC BLOOD PRESSURE: 82 MMHG | BODY MASS INDEX: 45.99 KG/M2

## 2025-03-14 DIAGNOSIS — K21.9 GASTROESOPHAGEAL REFLUX DISEASE WITHOUT ESOPHAGITIS: ICD-10-CM

## 2025-03-14 DIAGNOSIS — E55.9 VITAMIN D DEFICIENCY: ICD-10-CM

## 2025-03-14 DIAGNOSIS — Z79.899 ENCOUNTER FOR LONG-TERM (CURRENT) USE OF MEDICATIONS: ICD-10-CM

## 2025-03-14 DIAGNOSIS — L70.0 ACNE VULGARIS: ICD-10-CM

## 2025-03-14 DIAGNOSIS — I10 ESSENTIAL HYPERTENSION: Primary | ICD-10-CM

## 2025-03-14 DIAGNOSIS — J30.1 SEASONAL ALLERGIC RHINITIS DUE TO POLLEN: ICD-10-CM

## 2025-03-14 DIAGNOSIS — M79.89 LEG SWELLING: ICD-10-CM

## 2025-03-14 DIAGNOSIS — E66.01 MORBID OBESITY WITH BMI OF 60.0-69.9, ADULT: ICD-10-CM

## 2025-03-14 DIAGNOSIS — F41.9 ANXIETY: ICD-10-CM

## 2025-03-14 PROCEDURE — 3079F DIAST BP 80-89 MM HG: CPT | Performed by: FAMILY MEDICINE

## 2025-03-14 PROCEDURE — 3075F SYST BP GE 130 - 139MM HG: CPT | Performed by: FAMILY MEDICINE

## 2025-03-14 PROCEDURE — 99214 OFFICE O/P EST MOD 30 MIN: CPT | Performed by: FAMILY MEDICINE

## 2025-03-14 RX ORDER — TRETINOIN 0.5 MG/G
CREAM TOPICAL
Qty: 45 G | Refills: 5 | Status: SHIPPED | OUTPATIENT
Start: 2025-03-14

## 2025-03-14 RX ORDER — FUROSEMIDE 20 MG/1
20 TABLET ORAL 2 TIMES DAILY PRN
Qty: 180 TABLET | Refills: 3 | Status: SHIPPED | OUTPATIENT
Start: 2025-03-14

## 2025-03-14 RX ORDER — ESCITALOPRAM OXALATE 10 MG/1
10 TABLET ORAL DAILY
Qty: 90 TABLET | Refills: 3 | Status: SHIPPED | OUTPATIENT
Start: 2025-03-14

## 2025-03-14 RX ORDER — ERGOCALCIFEROL 1.25 MG/1
50000 CAPSULE, LIQUID FILLED ORAL
Qty: 24 CAPSULE | Refills: 3 | Status: SHIPPED | OUTPATIENT
Start: 2025-03-17

## 2025-03-14 RX ORDER — TRETINOIN 0.5 MG/G
CREAM TOPICAL
COMMUNITY
Start: 2025-02-09 | End: 2025-03-14 | Stop reason: SDUPTHER

## 2025-03-14 RX ORDER — OMEPRAZOLE 40 MG/1
40 CAPSULE, DELAYED RELEASE ORAL DAILY
Qty: 30 CAPSULE | Refills: 3 | Status: SHIPPED | OUTPATIENT
Start: 2025-03-14

## 2025-03-14 RX ORDER — LEVOCETIRIZINE DIHYDROCHLORIDE 5 MG/1
5 TABLET, FILM COATED ORAL NIGHTLY
Qty: 90 TABLET | Refills: 3 | Status: SHIPPED | OUTPATIENT
Start: 2025-03-14

## 2025-03-14 RX ORDER — MONTELUKAST SODIUM 10 MG/1
10 TABLET ORAL DAILY
Qty: 90 TABLET | Refills: 3 | Status: SHIPPED | OUTPATIENT
Start: 2025-03-14

## 2025-03-14 RX ORDER — OLMESARTAN MEDOXOMIL 20 MG/1
20 TABLET ORAL DAILY
Qty: 90 TABLET | Refills: 3 | Status: SHIPPED | OUTPATIENT
Start: 2025-03-14

## 2025-03-14 SDOH — ECONOMIC STABILITY: FOOD INSECURITY: WITHIN THE PAST 12 MONTHS, YOU WORRIED THAT YOUR FOOD WOULD RUN OUT BEFORE YOU GOT MONEY TO BUY MORE.: NEVER TRUE

## 2025-03-14 SDOH — ECONOMIC STABILITY: FOOD INSECURITY: WITHIN THE PAST 12 MONTHS, THE FOOD YOU BOUGHT JUST DIDN'T LAST AND YOU DIDN'T HAVE MONEY TO GET MORE.: NEVER TRUE

## 2025-03-14 ASSESSMENT — PATIENT HEALTH QUESTIONNAIRE - PHQ9
SUM OF ALL RESPONSES TO PHQ QUESTIONS 1-9: 0
SUM OF ALL RESPONSES TO PHQ QUESTIONS 1-9: 0
2. FEELING DOWN, DEPRESSED OR HOPELESS: NOT AT ALL
1. LITTLE INTEREST OR PLEASURE IN DOING THINGS: NOT AT ALL
SUM OF ALL RESPONSES TO PHQ QUESTIONS 1-9: 0
SUM OF ALL RESPONSES TO PHQ QUESTIONS 1-9: 0

## 2025-03-14 NOTE — PROGRESS NOTES
[facility] PRIMARY CARE  Kori Karimi M.D.  [address] [place] [address]  Phone:  [phone]  Fax:  [phone]    CHIEF COMPLAINT:  Chief Complaint   Patient presents with    Results     Patient recently had lab work done, would like to discuss results       Weight Management     Patient states her weight has been up and down, she had some rectal bleeding so she went to off of the medication for about a month and her weight all came back. She would like to increase her dose of Mounjaro. She would also like a referral for bariatric weight loss.     Hypertension     Patient does not check blood pressure at home,  Patient takes medications as prescribed. Denies chest pain, shortness of breath or headaches.           HISTORY OF PRESENT ILLNESS:  Ms. Davidson is a 39 y.o. female  who presents for follow up. Patient presents for weight management and consideration of weight loss surgery options. She reports struggling with physical activity and experiencing breathlessness due to her weight. The patient expresses a desire to continue Mounjaro (tirzepatide) treatment, noting previous weight loss attempts were made 10 years ago. She acknowledges difficulties with supervised diets and protein shakes in the past.    The patient reports fatigue but denies extreme fatigue or feeling cold. She mentions experiencing edema, noting visible rings when removing socks at night despite taking Lasix. The patient denies chest pain but confirms shortness of breath attributed to her weight.    Patient has a history of lupus and reports experiencing flare-ups with sun exposure, as confirmed by her rheumatologist. She also mentions a recent episode of rectal bleeding, which was attributed to hemorrhoids following an EGD and colonoscopy. The bleeding occurred while taking metformin and Adipex, which she believes exacerbated her hemorrhoids.    The patient reports taking metformin for diabetes management and finds

## 2025-03-18 ENCOUNTER — OFFICE VISIT (OUTPATIENT)
Dept: SURGERY | Age: 40
End: 2025-03-18
Payer: COMMERCIAL

## 2025-03-18 VITALS
HEIGHT: 66 IN | BODY MASS INDEX: 47.09 KG/M2 | HEART RATE: 79 BPM | WEIGHT: 293 LBS | SYSTOLIC BLOOD PRESSURE: 135 MMHG | DIASTOLIC BLOOD PRESSURE: 84 MMHG

## 2025-03-18 DIAGNOSIS — Z13.21 ENCOUNTER FOR VITAMIN DEFICIENCY SCREENING: ICD-10-CM

## 2025-03-18 DIAGNOSIS — Z71.89 ENCOUNTER FOR PRE-BARIATRIC SURGERY COUNSELING AND EDUCATION: ICD-10-CM

## 2025-03-18 DIAGNOSIS — E66.01 MORBID OBESITY: ICD-10-CM

## 2025-03-18 DIAGNOSIS — K21.9 GASTROESOPHAGEAL REFLUX DISEASE, UNSPECIFIED WHETHER ESOPHAGITIS PRESENT: ICD-10-CM

## 2025-03-18 DIAGNOSIS — I10 ESSENTIAL HYPERTENSION: ICD-10-CM

## 2025-03-18 DIAGNOSIS — Z01.812 ENCOUNTER FOR PRE-OPERATIVE LABORATORY TESTING: ICD-10-CM

## 2025-03-18 DIAGNOSIS — E66.01 MORBID OBESITY: Primary | ICD-10-CM

## 2025-03-18 LAB
25(OH)D3 SERPL-MCNC: 26.4 NG/ML (ref 30–100)
CHOLEST SERPL-MCNC: 173 MG/DL (ref 0–200)
EST. AVERAGE GLUCOSE BLD GHB EST-MCNC: 105 MG/DL
FERRITIN SERPL-MCNC: 21 NG/ML (ref 8–388)
FOLATE SERPL-MCNC: 5.9 NG/ML (ref 3.1–17.5)
HBA1C MFR BLD: 5.3 % (ref 0–5.6)
HDLC SERPL-MCNC: 55 MG/DL (ref 40–60)
HDLC SERPL: 3.1 (ref 0–5)
IRON SERPL-MCNC: 37 UG/DL (ref 35–100)
LDLC SERPL CALC-MCNC: 91 MG/DL (ref 0–100)
TRIGL SERPL-MCNC: 134 MG/DL (ref 0–150)
TSH, 3RD GENERATION: 1.93 UIU/ML (ref 0.27–4.2)
VIT B12 SERPL-MCNC: 294 PG/ML (ref 193–986)
VLDLC SERPL CALC-MCNC: 27 MG/DL (ref 6–23)

## 2025-03-18 PROCEDURE — 3075F SYST BP GE 130 - 139MM HG: CPT | Performed by: PHYSICIAN ASSISTANT

## 2025-03-18 PROCEDURE — 3079F DIAST BP 80-89 MM HG: CPT | Performed by: PHYSICIAN ASSISTANT

## 2025-03-18 PROCEDURE — 99205 OFFICE O/P NEW HI 60 MIN: CPT | Performed by: PHYSICIAN ASSISTANT

## 2025-03-18 NOTE — PROGRESS NOTES
behavior modification program, reduced calorie diet program, and participation in our exercise program recommendations supervised by our office  Sleep [facility] Evaluation    Eli Salinas PA-C  3/18/2025    Time spent: 60 minutes was spent preparing to see patient (including chart review and preparation), obtaining and/or reviewing additional medical history, performing a physical exam and evaluation, documenting clinical information in the EHR, independently interpreting results, communicating results to patient, family or caregiver, and/or coordinating care.

## 2025-03-20 LAB
COTININE SERPL-MCNC: <1 NG/ML
NICOTINE SERPL-MCNC: <1 NG/ML

## 2025-03-21 DIAGNOSIS — Z71.89 ENCOUNTER FOR PRE-BARIATRIC SURGERY COUNSELING AND EDUCATION: ICD-10-CM

## 2025-03-21 DIAGNOSIS — Z01.812 ENCOUNTER FOR PRE-OPERATIVE LABORATORY TESTING: ICD-10-CM

## 2025-03-21 DIAGNOSIS — E66.01 MORBID OBESITY: ICD-10-CM

## 2025-03-21 LAB
AMPHET UR QL SCN: NEGATIVE
BARBITURATES UR QL SCN: NEGATIVE
BENZODIAZ UR QL: NEGATIVE
CANNABINOIDS UR QL SCN: NEGATIVE
COCAINE UR QL SCN: NEGATIVE
METHADONE UR QL: NEGATIVE
OPIATES UR QL: NEGATIVE
PCP UR QL: NEGATIVE

## 2025-03-23 LAB
PETH BLD QL SCN: POSITIVE
PETH BLD-MCNC: 183 NG/ML

## 2025-03-24 ENCOUNTER — OFFICE VISIT (OUTPATIENT)
Dept: BEHAVIORAL/MENTAL HEALTH CLINIC | Facility: CLINIC | Age: 40
End: 2025-03-24
Payer: COMMERCIAL

## 2025-03-24 DIAGNOSIS — E66.01 MORBID OBESITY WITH BMI OF 60.0-69.9, ADULT: ICD-10-CM

## 2025-03-24 DIAGNOSIS — F41.9 ANXIETY DISORDER, UNSPECIFIED TYPE: Primary | ICD-10-CM

## 2025-03-24 DIAGNOSIS — F32.A DEPRESSION, UNSPECIFIED DEPRESSION TYPE: ICD-10-CM

## 2025-03-24 DIAGNOSIS — F10.10 ALCOHOL ABUSE: ICD-10-CM

## 2025-03-24 PROCEDURE — 90791 PSYCH DIAGNOSTIC EVALUATION: CPT | Performed by: SOCIAL WORKER
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
## 2025-03-25 ASSESSMENT — LIFESTYLE VARIABLES
HOW OFTEN DO YOU HAVE A DRINK CONTAINING ALCOHOL: 4 OR MORE TIMES A WEEK
HOW MANY STANDARD DRINKS CONTAINING ALCOHOL DO YOU HAVE ON A TYPICAL DAY: 3 OR 4

## 2025-03-25 ASSESSMENT — PATIENT HEALTH QUESTIONNAIRE - PHQ9
SUM OF ALL RESPONSES TO PHQ QUESTIONS 1-9: 11
8. MOVING OR SPEAKING SO SLOWLY THAT OTHER PEOPLE COULD HAVE NOTICED. OR THE OPPOSITE, BEING SO FIGETY OR RESTLESS THAT YOU HAVE BEEN MOVING AROUND A LOT MORE THAN USUAL: NOT AT ALL
3. TROUBLE FALLING OR STAYING ASLEEP: NOT AT ALL
SUM OF ALL RESPONSES TO PHQ QUESTIONS 1-9: 11
10. IF YOU CHECKED OFF ANY PROBLEMS, HOW DIFFICULT HAVE THESE PROBLEMS MADE IT FOR YOU TO DO YOUR WORK, TAKE CARE OF THINGS AT HOME, OR GET ALONG WITH OTHER PEOPLE: SOMEWHAT DIFFICULT
6. FEELING BAD ABOUT YOURSELF - OR THAT YOU ARE A FAILURE OR HAVE LET YOURSELF OR YOUR FAMILY DOWN: MORE THAN HALF THE DAYS
SUM OF ALL RESPONSES TO PHQ QUESTIONS 1-9: 11
5. POOR APPETITE OR OVEREATING: NEARLY EVERY DAY
2. FEELING DOWN, DEPRESSED OR HOPELESS: SEVERAL DAYS
SUM OF ALL RESPONSES TO PHQ QUESTIONS 1-9: 11
9. THOUGHTS THAT YOU WOULD BE BETTER OFF DEAD, OR OF HURTING YOURSELF: NOT AT ALL
1. LITTLE INTEREST OR PLEASURE IN DOING THINGS: SEVERAL DAYS
7. TROUBLE CONCENTRATING ON THINGS, SUCH AS READING THE NEWSPAPER OR WATCHING TELEVISION: SEVERAL DAYS
4. FEELING TIRED OR HAVING LITTLE ENERGY: NEARLY EVERY DAY

## 2025-03-25 ASSESSMENT — ANXIETY QUESTIONNAIRES
7. FEELING AFRAID AS IF SOMETHING AWFUL MIGHT HAPPEN: NOT AT ALL
1. FEELING NERVOUS, ANXIOUS, OR ON EDGE: SEVERAL DAYS
GAD7 TOTAL SCORE: 5
5. BEING SO RESTLESS THAT IT IS HARD TO SIT STILL: NOT AT ALL
4. TROUBLE RELAXING: NOT AT ALL
6. BECOMING EASILY ANNOYED OR IRRITABLE: MORE THAN HALF THE DAYS
3. WORRYING TOO MUCH ABOUT DIFFERENT THINGS: SEVERAL DAYS
2. NOT BEING ABLE TO STOP OR CONTROL WORRYING: SEVERAL DAYS

## 2025-04-07 ENCOUNTER — OFFICE VISIT (OUTPATIENT)
Dept: SURGERY | Age: 40
End: 2025-04-07
Payer: COMMERCIAL

## 2025-04-07 VITALS
SYSTOLIC BLOOD PRESSURE: 135 MMHG | HEIGHT: 66 IN | DIASTOLIC BLOOD PRESSURE: 81 MMHG | WEIGHT: 293 LBS | BODY MASS INDEX: 47.09 KG/M2 | HEART RATE: 65 BPM

## 2025-04-07 DIAGNOSIS — Z71.82 EXERCISE COUNSELING: ICD-10-CM

## 2025-04-07 DIAGNOSIS — Z71.3 NUTRITIONAL COUNSELING: ICD-10-CM

## 2025-04-07 DIAGNOSIS — Z01.812 ENCOUNTER FOR PRE-OPERATIVE LABORATORY TESTING: ICD-10-CM

## 2025-04-07 DIAGNOSIS — K21.9 GASTROESOPHAGEAL REFLUX DISEASE, UNSPECIFIED WHETHER ESOPHAGITIS PRESENT: ICD-10-CM

## 2025-04-07 DIAGNOSIS — E66.01 MORBID OBESITY: Primary | ICD-10-CM

## 2025-04-07 DIAGNOSIS — E55.9 VITAMIN D DEFICIENCY: ICD-10-CM

## 2025-04-07 DIAGNOSIS — I10 ESSENTIAL HYPERTENSION: ICD-10-CM

## 2025-04-07 PROCEDURE — 99215 OFFICE O/P EST HI 40 MIN: CPT | Performed by: PHYSICIAN ASSISTANT

## 2025-04-07 PROCEDURE — 3075F SYST BP GE 130 - 139MM HG: CPT | Performed by: PHYSICIAN ASSISTANT

## 2025-04-07 PROCEDURE — 3079F DIAST BP 80-89 MM HG: CPT | Performed by: PHYSICIAN ASSISTANT

## 2025-04-07 RX ORDER — TIRZEPATIDE 12.5 MG/.5ML
INJECTION, SOLUTION SUBCUTANEOUS
COMMUNITY
Start: 2025-03-17

## 2025-04-07 NOTE — PROGRESS NOTES
[facility] INITIAL ASSESSMENT    Nutrition Assessment:  Anthropometrics:    Ht: 5’6”, wt: 396#, 305% IBW, 64.08 BMI    Macronutrient needs assessment   EER: 7691-5086 kcal/d (14-16 kcal/kg ABW)    MSJ x 1.3-500 = 2740 kcal/d (sedentary AF)   EPR: 59-87g pro (1.0-1.5 gm pro/kg IBW)   CHO: ~342 g CHO/d (50% EER, ~7 servings CHO/meal)   H2O: 1mL/kcal or per MD recommendations     Support:  Pt has told  and sister - to be good support.  Reminded pt about [facility] support group and encouraged attendance.      Motivation:  High. Reports 15+ previous weight loss attempts including medication (Mounjaro, Trulicity) and self-supervised diets with exercise.     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    Exercise Assessment:   PAR-Q: No contraindications to exercise   Medical:     Pain or injuries (present): None    Past surgeries related to mobility: None   Exercise equipment at home: Elliptical and free wts    Gym Membership: Yes - [employer]   Current Exercise Routine: Elliptical and wts for 30min 3-4x  Assessment Exercise Goal: Continue current exercise and increase as able    Nutrition Diagnosis:  Morbid obesity R/T excessive energy intake and yoyo dieting as evidenced by BMI = 64.08 and 305 % IBW.      Nutrition Intervention:   Diet Rx - Low-moderate calorie intake (~2540 kcal/day).  Work on eating 3 meals/d and meal balance.   Modify distribution, type or amount of food and nutrients 
caregiver, and/or coordinating care.

## 2025-04-09 ENCOUNTER — HOSPITAL ENCOUNTER (OUTPATIENT)
Dept: GENERAL RADIOLOGY | Age: 40
Discharge: HOME | End: 2025-04-12
Payer: COMMERCIAL

## 2025-04-09 DIAGNOSIS — K21.9 GASTROESOPHAGEAL REFLUX DISEASE, UNSPECIFIED WHETHER ESOPHAGITIS PRESENT: ICD-10-CM

## 2025-04-09 DIAGNOSIS — Z01.812 ENCOUNTER FOR PRE-OPERATIVE LABORATORY TESTING: ICD-10-CM

## 2025-04-09 DIAGNOSIS — Z71.89 ENCOUNTER FOR PRE-BARIATRIC SURGERY COUNSELING AND EDUCATION: ICD-10-CM

## 2025-04-09 DIAGNOSIS — E66.01 MORBID OBESITY: ICD-10-CM

## 2025-04-09 PROCEDURE — 2500000003 HC RX 250 WO HCPCS: Performed by: PHYSICIAN ASSISTANT

## 2025-04-09 PROCEDURE — 6370000000 HC RX 637 (ALT 250 FOR IP): Performed by: PHYSICIAN ASSISTANT

## 2025-04-09 PROCEDURE — 74240 X-RAY XM UPR GI TRC 1CNTRST: CPT

## 2025-04-09 RX ADMIN — ANTACID/ANTIFLATULENT 1 EACH: 380; 550; 10; 10 GRANULE, EFFERVESCENT ORAL at 08:35

## 2025-04-09 RX ADMIN — BARIUM SULFATE 140 ML: 980 POWDER, FOR SUSPENSION ORAL at 08:35

## 2025-04-09 RX ADMIN — BARIUM SULFATE 355 ML: 0.6 SUSPENSION ORAL at 08:39

## 2025-04-10 ENCOUNTER — HOSPITAL ENCOUNTER (OUTPATIENT)
Dept: PHYSICAL THERAPY | Age: 40
Setting detail: RECURRING SERIES
Discharge: HOME | End: 2025-04-13
Payer: COMMERCIAL

## 2025-04-10 PROCEDURE — 97162 PT EVAL MOD COMPLEX 30 MIN: CPT

## 2025-04-10 NOTE — THERAPY EVALUATION
Ilene Davidson  : 1985  Primary: [employer] ([employer])  Secondary:  [facility] [address] [address]  Phone: [phone]  Fax: [phone] Plan Frequency: 1 visit  Plan of Care/Certification Expiration Date: 04/10/25      PT Visit Info:    Plan Frequency: 1 visit  Plan of Care/Certification Expiration Date: 04/10/25  Total # of Visits to Date: 1      Visit Count:  1                OUTPATIENT PHYSICAL THERAPY:             Initial Assessment 4/10/2025               Episode  Appt Desk         Treatment Diagnosis:  Difficulty in walking (R26.2); Generalized muscle weakness (M62.81)   Medical/Referring Diagnosis:  Morbid obesity [E66.01]  Body mass index (BMI) 60.0-69.9, adult [Z68.44]  Encounter for pre-operative laboratory testing [Z01.812]  Encounter for pre-bariatric surgery counseling and education [Z71.89]  Referring Physician:  Eli Salinas PA MD Orders:  PT Eval and Treat   Return MD Appt:  May 2025  Date of Onset:       Allergies:  Patient has no known allergies.  Restrictions/Precautions:         Medications Last Reviewed:  4/10/2025     SUBJECTIVE   History of Injury/Illness (Reason for Referral):  Patient is here for the PT portion of the surgical weight loss program.   Patient Stated Goal(s):  \"learn exercises\"  Initial:     not rated /10 Post Session:   not rated   /10  Past Medical History/Comorbidities:   Ms. Davidson  has a past medical history of Cellulitis, Essential hypertension, Gastroesophageal reflux disease without esophagitis, Morbid obesity, Rh negative status during pregnancy, and Supervision of normal pregnancy.  Ms. Davidson  has a past surgical history that includes egd deliver thermal energy sphnctr/cardia gerd (2016);  section (2019);  section (2016); Salpingo-oophorectomy (2019); Colonoscopy (N/A, 2024); and Upper gastrointestinal endoscopy (N/A, 2024).  Social

## 2025-04-22 ENCOUNTER — OFFICE VISIT (OUTPATIENT)
Dept: BEHAVIORAL/MENTAL HEALTH CLINIC | Facility: CLINIC | Age: 40
End: 2025-04-22

## 2025-04-22 DIAGNOSIS — F41.9 ANXIETY DISORDER, UNSPECIFIED TYPE: Primary | ICD-10-CM

## 2025-04-22 DIAGNOSIS — F32.A DEPRESSION, UNSPECIFIED DEPRESSION TYPE: ICD-10-CM

## 2025-04-22 DIAGNOSIS — F10.10 ALCOHOL ABUSE: ICD-10-CM

## 2025-04-22 DIAGNOSIS — Z01.812 ENCOUNTER FOR PRE-OPERATIVE LABORATORY TESTING: ICD-10-CM

## 2025-04-22 PROCEDURE — 90834 PSYTX W PT 45 MINUTES: CPT | Performed by: SOCIAL WORKER

## 2025-04-22 NOTE — PROGRESS NOTES
BARIATRIC FOLLOW UP NOTE  NAME: Ilene Davidson    DATE: 4/22/2025    TYPE OF SERVICE: Individual Treatment    LOCATION OF SERVICE: [facility]/ In person visit at office    DURATION: 40 mins    DIAGNOSIS: :  No diagnosis found.    SURGERY STATUS: Pended until updated PETH score is posted    MENTAL STATUS EXAM:  Pt was appropriately groomed.  Pt was 0x4.  No unusual mannerisms were noted.  No psychotic symptoms displayed by pt.  Pt was cooperative and engaged in the session.  Insight and judgment were intact.  Denied suicidal or homicidal ideation. Perceptions were appropriate. Attention and concentration were normal.  No memory impairments were noted.  Fund of knowledge was within normal limits.  Speech pattern and language were intact. 0x4.   Mood/Affect:even with congruent affect  Thought Process: coherent    Pt is progressing on goals.    Pt is a 39 y.o. female who presents today for the second appointment with this SW following the initial psychological evaluation for bariatric surgery.  The pt was referred for a second appointment due to her PETH score of 184 which indicated alcohol consumption at a level that could be harmful to her health.    Pt reports she has not had any alcohol for the past three weeks.  She plans to have her labs drawn again this week.  The pt states she was surprised by the high score.  She reports mainly drinking on the weekends only.  She states she mainly drinks in social situations, but she can drink up to 4 drinks or 6 seltzers at one time.  She denies having cravings.  MI questions were asked.  The pros and cons of alcohol consumption were discussed.  SW advised the pt to continue to abstain from alcohol until her surgery and then for at least 6 months afterwards.  How no alcohol consumption could lead to greater success after surgery was discussed.    Plan: Pt encouraged to attend the supportive group.  Continued adherence to the bariatric regimen was

## 2025-04-27 LAB
PETH BLD QL SCN: POSITIVE
PETH BLD-MCNC: 29 NG/ML

## 2025-04-28 ENCOUNTER — PATIENT MESSAGE (OUTPATIENT)
Dept: PRIMARY CARE CLINIC | Facility: CLINIC | Age: 40
End: 2025-04-28

## 2025-04-28 RX ORDER — TIRZEPATIDE 12.5 MG/.5ML
12.5 INJECTION, SOLUTION SUBCUTANEOUS WEEKLY
Qty: 2 ML | Refills: 5 | Status: SHIPPED | OUTPATIENT
Start: 2025-04-28

## 2025-05-02 ENCOUNTER — OFFICE VISIT (OUTPATIENT)
Dept: SURGERY | Age: 40
End: 2025-05-02
Payer: COMMERCIAL

## 2025-05-02 VITALS
SYSTOLIC BLOOD PRESSURE: 121 MMHG | DIASTOLIC BLOOD PRESSURE: 90 MMHG | WEIGHT: 293 LBS | HEART RATE: 71 BPM | HEIGHT: 66 IN | BODY MASS INDEX: 47.09 KG/M2

## 2025-05-02 DIAGNOSIS — K21.9 GASTROESOPHAGEAL REFLUX DISEASE, UNSPECIFIED WHETHER ESOPHAGITIS PRESENT: ICD-10-CM

## 2025-05-02 DIAGNOSIS — E66.01 MORBID OBESITY (HCC): Primary | ICD-10-CM

## 2025-05-02 DIAGNOSIS — Z71.3 NUTRITIONAL COUNSELING: ICD-10-CM

## 2025-05-02 DIAGNOSIS — Z01.812 ENCOUNTER FOR PRE-OPERATIVE LABORATORY TESTING: ICD-10-CM

## 2025-05-02 DIAGNOSIS — I10 ESSENTIAL HYPERTENSION: ICD-10-CM

## 2025-05-02 DIAGNOSIS — Z71.82 EXERCISE COUNSELING: ICD-10-CM

## 2025-05-02 PROCEDURE — 99215 OFFICE O/P EST HI 40 MIN: CPT | Performed by: PHYSICIAN ASSISTANT

## 2025-05-02 PROCEDURE — 3074F SYST BP LT 130 MM HG: CPT | Performed by: PHYSICIAN ASSISTANT

## 2025-05-02 PROCEDURE — 3080F DIAST BP >= 90 MM HG: CPT | Performed by: PHYSICIAN ASSISTANT

## 2025-05-02 NOTE — PROGRESS NOTES
NUTRITION REASSESSMENT    Evaluation:    Pt reports good dietary compliance over the past month.  Pt is eating at least 3x/d.  Pt reports good compliance with mindful eating behaviors. Pt is practicing not drinking with meals. Pt is exercising via walking and ellipitical for 30min 3-4x. Pt has found acceptable dietary supplements for use after surgery.Pt almost has diet coke eliminated - plans to eliminate by pre op appointment  Diet Recall:  Breakfast: 3 boiled eggs  Lunch: Ribs, green beans, and rice  Dinner: Chx sandwich and tossed salad    Intervention:    Continue with current diet rx.  Discussed exploring and trialing nutritional supplements for pre/post-op care.  Educated on pre and post-operative diet.     *Pt verbalizes understanding of information provided during this session.     Impression:      Readiness to learn and change: Fair   Comprehension level: Fair   Anticipated compliance: Fair     Fair SWL candidate at this time.  Pt has made good changes during supervised diet with RD. RD feels as though pt will be able to adhere to post-operative instructions and plan of care. Pt understands the habits that must be in place to support the SWL tool. I look forward to continuing to work with pt.     Christal Colon MBA. CLINT, LD   
independently interpreting results, communicating results to patient, family or caregiver, and/or coordinating care.

## 2025-05-07 DIAGNOSIS — Z01.812 ENCOUNTER FOR PRE-OPERATIVE LABORATORY TESTING: ICD-10-CM

## 2025-05-13 LAB
PETH BLD QL SCN: NEGATIVE
PETH BLD-MCNC: NEGATIVE NG/ML

## 2025-05-14 ENCOUNTER — PREP FOR PROCEDURE (OUTPATIENT)
Dept: SURGERY | Age: 40
End: 2025-05-14

## 2025-05-14 DIAGNOSIS — I10 PRIMARY HYPERTENSION: ICD-10-CM

## 2025-05-14 DIAGNOSIS — E66.01 MORBID OBESITY (HCC): ICD-10-CM

## 2025-05-20 NOTE — H&P (VIEW-ONLY)
preparation), obtaining and/or reviewing additional medical history, performing a physical exam and evaluation, documenting clinical information in the electronic health record, independently interpreting results, communicating results to patient, family or caregiver, and/or coordinating care.     Signed: Jacquelin Castle MD  Bariatric & Minimally Invasive Surgery  5/21/2025

## 2025-05-20 NOTE — PROGRESS NOTES
Jacquelin Castle MD   Bariatric & Advanced Laparoscopic Surgery & Endoscopy  [address], [address]  [place] [address]  Phone [phone]   Fax [phone]      Date of visit: 2025      Primary/Requesting provider: Kori Karimi MD         Name: Ilene Davidson      MRN: [id number]       : 1985       Age: 39 y.o.    Sex: female        PCP: Kori Karimi MD     CC:    Chief Complaint   Patient presents with    Follow-up     BARIATRIC PREOP - pre-op RYGB       Procedure: robotic assisted gastric bypass    Surgical Consult Date: 3/19/2025    Surgical Date: TBD     The patient is a 39 y.o. female presenting with a height of 1.676 m (5' 6\") and weight of (!) 175.5 kg (387 lb), giving her a Body mass index is 62.46 kg/m².   She has an ideal body weight of 154 lbs, and excess body weight of 233 lbs.   She started our program with a weight of 396 lbs, losing 9 lbs.    She has completed all aspects of our prep program and has been deemed an acceptable candidate for bariatric surgery.    30-Day Bariatric Surgical Risk Percentage: 10.49%    PSYCHOLOGICAL EVALUATION:   Completed, 2025 with Kathie Winston deeming her an appropriate surgical candidate.    DIETITIAN EVALUATION:  Completed with Christal Colon RD, LD deeming her an appropriate surgical candidate.    BARIATRIC LABS:  Completed, 2025 (vitamin D 26.4, VLDL 27, PETH positive)     UPPER GI:  Completed, 2025  IMPRESSION:  1. Spontaneous gastroesophageal reflux to the midesophagus.  2. Otherwise unremarkable upper GI series.      PHYSICAL THERAPY EVALUATION FOR MOBILITY OPTIMIZATION:  Completed, 04/10/2025     We have reviewed the procedure again today and completed our standard pre op teaching. Her questions were answered and she is ready to schedule surgery.    We have discussed the procedure, diet and exercise regimens, and potential complications of surgery.  The patient

## 2025-05-21 ENCOUNTER — OFFICE VISIT (OUTPATIENT)
Dept: SURGERY | Age: 40
End: 2025-05-21
Payer: COMMERCIAL

## 2025-05-21 VITALS
WEIGHT: 293 LBS | DIASTOLIC BLOOD PRESSURE: 73 MMHG | HEIGHT: 66 IN | HEART RATE: 72 BPM | SYSTOLIC BLOOD PRESSURE: 137 MMHG | BODY MASS INDEX: 47.09 KG/M2

## 2025-05-21 DIAGNOSIS — Z71.82 EXERCISE COUNSELING: ICD-10-CM

## 2025-05-21 DIAGNOSIS — I10 PRIMARY HYPERTENSION: Primary | ICD-10-CM

## 2025-05-21 DIAGNOSIS — K21.9 GASTROESOPHAGEAL REFLUX DISEASE, UNSPECIFIED WHETHER ESOPHAGITIS PRESENT: ICD-10-CM

## 2025-05-21 DIAGNOSIS — E66.813 CLASS 3 SEVERE OBESITY DUE TO EXCESS CALORIES WITH SERIOUS COMORBIDITY AND BODY MASS INDEX (BMI) OF 60.0 TO 69.9 IN ADULT (HCC): ICD-10-CM

## 2025-05-21 DIAGNOSIS — E55.9 VITAMIN D DEFICIENCY: ICD-10-CM

## 2025-05-21 DIAGNOSIS — Z71.89 ENCOUNTER FOR PRE-BARIATRIC SURGERY COUNSELING AND EDUCATION: ICD-10-CM

## 2025-05-21 DIAGNOSIS — Z71.3 NUTRITIONAL COUNSELING: ICD-10-CM

## 2025-05-21 PROCEDURE — 3078F DIAST BP <80 MM HG: CPT | Performed by: SURGERY

## 2025-05-21 PROCEDURE — 99215 OFFICE O/P EST HI 40 MIN: CPT | Performed by: SURGERY

## 2025-05-21 PROCEDURE — 3075F SYST BP GE 130 - 139MM HG: CPT | Performed by: SURGERY

## 2025-05-21 RX ORDER — OXYCODONE AND ACETAMINOPHEN 5; 325 MG/1; MG/1
1 TABLET ORAL EVERY 6 HOURS PRN
Qty: 5 TABLET | Refills: 0 | Status: SHIPPED | OUTPATIENT
Start: 2025-05-21 | End: 2025-05-24

## 2025-05-21 RX ORDER — ONDANSETRON 4 MG/1
4 TABLET, FILM COATED ORAL 3 TIMES DAILY PRN
Qty: 30 TABLET | Refills: 1 | Status: SHIPPED | OUTPATIENT
Start: 2025-05-21

## 2025-06-09 ENCOUNTER — HOSPITAL ENCOUNTER (OUTPATIENT)
Dept: SURGERY | Age: 40
Discharge: HOME | End: 2025-06-12
Payer: COMMERCIAL

## 2025-06-09 VITALS
BODY MASS INDEX: 45.99 KG/M2 | SYSTOLIC BLOOD PRESSURE: 129 MMHG | RESPIRATION RATE: 18 BRPM | DIASTOLIC BLOOD PRESSURE: 85 MMHG | WEIGHT: 293 LBS | TEMPERATURE: 98.4 F | HEIGHT: 67 IN | OXYGEN SATURATION: 97 % | HEART RATE: 63 BPM

## 2025-06-09 LAB
CREAT SERPL-MCNC: 0.59 MG/DL (ref 0.6–1.1)
HGB BLD-MCNC: 10.8 G/DL (ref 11.7–15.4)
POTASSIUM SERPL-SCNC: 3.8 MMOL/L (ref 3.5–5.1)

## 2025-06-09 PROCEDURE — 82565 ASSAY OF CREATININE: CPT

## 2025-06-09 PROCEDURE — 85018 HEMOGLOBIN: CPT

## 2025-06-09 PROCEDURE — 84132 ASSAY OF SERUM POTASSIUM: CPT

## 2025-06-09 ASSESSMENT — PAIN SCALES - GENERAL: PAINLEVEL_OUTOF10: 0

## 2025-06-09 NOTE — PERIOP NOTE
PLEASE CONTINUE TAKING ALL PRESCRIPTION MEDICATIONS UP TO THE DAY OF SURGERY UNLESS OTHERWISE DIRECTED BELOW. You may take Tylenol, allergy, and/or indigestion medications.     TAKE ONLY THESE MEDICATIONS ON THE DAY OF SURGERY ON 6/16/25      Lexapro, Xyzal, Montelukast, Omeprazole            DISCONTINUE all vitamins, herbals, and supplements 7 days prior to surgery. DISCONTINUE Non-Steroidal Anti-Inflammatory (NSAIDS) such as Advil, Ibuprofen, Motrin, Naproxen, and Aleve 5 days prior to surgery unless instructed to hold longer by surgeon.     Home Medications to Hold- please continue all other medications except these.      Vitamin D=hold the week prior to surgery         Comments      On the day before surgery (6/15/25) please take 2 Tylenol in the morning and then again before bed. You may use either regular or extra strength.      Bring: Photo ID, Insurance card, Kinjal-hex soap, Incentive Spirometer       Please do not bring home medications with you on the day of surgery unless otherwise directed by your nurse.  If you are instructed to bring home medications, please give them to your nurse as they will be administered by the nursing staff.    If you have any questions, please call [facility] [phone].    A copy of this note was provided to the patient for reference.

## 2025-06-09 NOTE — PERIOP NOTE
Patient verified name and     Order for consent was not found in EHR; patient verified.     Type 2 surgery, walk-in assessment complete.    Labs per surgeon: No lab orders received at time of assessment.    Labs per anesthesia protocol: HGB, K+, Creatinine; results pending.   EKG: not needed per anesthesia protocol.     Instructed on clear liquid diet the day before surgery. Follow clear liquid instructions/list from surgeons office.     Patient provided with and instructed on educational handouts including Guide to Surgery, Preventing Surgical Site Infections, Pain Management, and [facility] Brochure.    Patient answered medical/surgical history questions at their best of ability. All prior to admission medications documented in EPIC. Original medication prescription bottles was not visualized during patient appointment.     Patient instructed to hold all vitamins 7 days prior to surgery and NSAIDS 5 days prior to surgery, patient verbalized understanding.     Patient teach back successful and patient demonstrates knowledge of instructions.

## 2025-06-09 NOTE — PERIOP NOTE
SURGICAL WEIGHT LOSS Enhanced Recovery After Surgery: diabetic and non-diabetic patients      The evening before surgery 6/15/25, drink 1 bottles of the Ensure Pre-Surgery drink.     Clear liquid diet the day before surgery.     Please do not eat or drink after midnight the night before your surgery.     Bring your patient handbook with you to the hospital.        Things to Remember:      1. You will be up in a chair the evening of surgery and sipping on clear liquids, once released by your surgeon.       2. Beginning the day of surgery, you will be out of the bed as able, once released by your hospital team. We encourage you to be sitting up in a chair, walking in the ivory, doing exercises as advised by physical therapy, etc.       3. You will be given scheduled non-narcotic pain medication to help keep your pain under control.  You will have stronger pain medication ordered for break through pain.      4. All of these measures are geared toward improving your overall surgical recovery and   decreasing the risk of complications.

## 2025-06-11 DIAGNOSIS — K21.9 GASTROESOPHAGEAL REFLUX DISEASE WITHOUT ESOPHAGITIS: ICD-10-CM

## 2025-06-11 RX ORDER — OMEPRAZOLE 40 MG/1
CAPSULE, DELAYED RELEASE ORAL DAILY
Qty: 90 CAPSULE | Refills: 1 | OUTPATIENT
Start: 2025-06-11

## 2025-06-15 ENCOUNTER — ANESTHESIA EVENT (OUTPATIENT)
Dept: SURGERY | Age: 40
DRG: 621 | End: 2025-06-15
Payer: COMMERCIAL

## 2025-06-16 ENCOUNTER — HOSPITAL ENCOUNTER (INPATIENT)
Age: 40
LOS: 1 days | Discharge: HOME | DRG: 621 | End: 2025-06-17
Attending: SURGERY | Admitting: SURGERY
Payer: COMMERCIAL

## 2025-06-16 ENCOUNTER — ANESTHESIA (OUTPATIENT)
Dept: SURGERY | Age: 40
DRG: 621 | End: 2025-06-16
Payer: COMMERCIAL

## 2025-06-16 LAB
ABO + RH BLD: NORMAL
BLOOD GROUP ANTIBODIES SERPL: NORMAL
SPECIMEN EXP DATE BLD: NORMAL

## 2025-06-16 PROCEDURE — 2720000010 HC SURG SUPPLY STERILE: Performed by: SURGERY

## 2025-06-16 PROCEDURE — 3700000001 HC ADD 15 MINUTES (ANESTHESIA): Performed by: SURGERY

## 2025-06-16 PROCEDURE — 3600000019 HC SURGERY ROBOT ADDTL 15MIN: Performed by: SURGERY

## 2025-06-16 PROCEDURE — 6360000002 HC RX W HCPCS: Performed by: PHYSICIAN ASSISTANT

## 2025-06-16 PROCEDURE — 6370000000 HC RX 637 (ALT 250 FOR IP): Performed by: PHYSICIAN ASSISTANT

## 2025-06-16 PROCEDURE — 1100000000 HC RM PRIVATE

## 2025-06-16 PROCEDURE — 8E0W4CZ ROBOTIC ASSISTED PROCEDURE OF TRUNK REGION, PERCUTANEOUS ENDOSCOPIC APPROACH: ICD-10-PCS | Performed by: SURGERY

## 2025-06-16 PROCEDURE — 2500000003 HC RX 250 WO HCPCS: Performed by: SURGERY

## 2025-06-16 PROCEDURE — 6360000002 HC RX W HCPCS: Performed by: NURSE ANESTHETIST, CERTIFIED REGISTERED

## 2025-06-16 PROCEDURE — 6360000002 HC RX W HCPCS: Performed by: STUDENT IN AN ORGANIZED HEALTH CARE EDUCATION/TRAINING PROGRAM

## 2025-06-16 PROCEDURE — 94760 N-INVAS EAR/PLS OXIMETRY 1: CPT

## 2025-06-16 PROCEDURE — 6360000002 HC RX W HCPCS: Performed by: SURGERY

## 2025-06-16 PROCEDURE — 86901 BLOOD TYPING SEROLOGIC RH(D): CPT

## 2025-06-16 PROCEDURE — 2500000003 HC RX 250 WO HCPCS: Performed by: PHYSICIAN ASSISTANT

## 2025-06-16 PROCEDURE — S2900 ROBOTIC SURGICAL SYSTEM: HCPCS | Performed by: SURGERY

## 2025-06-16 PROCEDURE — 6370000000 HC RX 637 (ALT 250 FOR IP): Performed by: STUDENT IN AN ORGANIZED HEALTH CARE EDUCATION/TRAINING PROGRAM

## 2025-06-16 PROCEDURE — 2700000000 HC OXYGEN THERAPY PER DAY

## 2025-06-16 PROCEDURE — 2580000003 HC RX 258: Performed by: NURSE ANESTHETIST, CERTIFIED REGISTERED

## 2025-06-16 PROCEDURE — 2500000003 HC RX 250 WO HCPCS: Performed by: NURSE ANESTHETIST, CERTIFIED REGISTERED

## 2025-06-16 PROCEDURE — 2580000003 HC RX 258: Performed by: PHYSICIAN ASSISTANT

## 2025-06-16 PROCEDURE — 86900 BLOOD TYPING SEROLOGIC ABO: CPT

## 2025-06-16 PROCEDURE — 2709999900 HC NON-CHARGEABLE SUPPLY: Performed by: SURGERY

## 2025-06-16 PROCEDURE — 0D164ZA BYPASS STOMACH TO JEJUNUM, PERCUTANEOUS ENDOSCOPIC APPROACH: ICD-10-PCS | Performed by: SURGERY

## 2025-06-16 PROCEDURE — 7100000000 HC PACU RECOVERY - FIRST 15 MIN: Performed by: SURGERY

## 2025-06-16 PROCEDURE — 0DJ08ZZ INSPECTION OF UPPER INTESTINAL TRACT, VIA NATURAL OR ARTIFICIAL OPENING ENDOSCOPIC: ICD-10-PCS | Performed by: SURGERY

## 2025-06-16 PROCEDURE — 3600000009 HC SURGERY ROBOT BASE: Performed by: SURGERY

## 2025-06-16 PROCEDURE — 7100000001 HC PACU RECOVERY - ADDTL 15 MIN: Performed by: SURGERY

## 2025-06-16 PROCEDURE — 3700000000 HC ANESTHESIA ATTENDED CARE: Performed by: SURGERY

## 2025-06-16 PROCEDURE — 97161 PT EVAL LOW COMPLEX 20 MIN: CPT

## 2025-06-16 PROCEDURE — 86850 RBC ANTIBODY SCREEN: CPT

## 2025-06-16 RX ORDER — SUCRALFATE 1 G/1
1 TABLET ORAL EVERY 6 HOURS SCHEDULED
Status: DISCONTINUED | OUTPATIENT
Start: 2025-06-16 | End: 2025-06-17 | Stop reason: HOSPADM

## 2025-06-16 RX ORDER — GABAPENTIN 300 MG/1
300 CAPSULE ORAL 3 TIMES DAILY
Status: DISCONTINUED | OUTPATIENT
Start: 2025-06-16 | End: 2025-06-17 | Stop reason: HOSPADM

## 2025-06-16 RX ORDER — MIDAZOLAM HYDROCHLORIDE 1 MG/ML
INJECTION, SOLUTION INTRAMUSCULAR; INTRAVENOUS
Status: DISCONTINUED | OUTPATIENT
Start: 2025-06-16 | End: 2025-06-16 | Stop reason: SDUPTHER

## 2025-06-16 RX ORDER — SODIUM CHLORIDE 9 MG/ML
INJECTION, SOLUTION INTRAVENOUS PRN
Status: DISCONTINUED | OUTPATIENT
Start: 2025-06-16 | End: 2025-06-16 | Stop reason: HOSPADM

## 2025-06-16 RX ORDER — HEPARIN SODIUM 5000 [USP'U]/ML
5000 INJECTION, SOLUTION INTRAVENOUS; SUBCUTANEOUS EVERY 8 HOURS SCHEDULED
Status: DISCONTINUED | OUTPATIENT
Start: 2025-06-16 | End: 2025-06-17 | Stop reason: HOSPADM

## 2025-06-16 RX ORDER — SODIUM CHLORIDE 0.9 % (FLUSH) 0.9 %
5-40 SYRINGE (ML) INJECTION EVERY 12 HOURS SCHEDULED
Status: DISCONTINUED | OUTPATIENT
Start: 2025-06-16 | End: 2025-06-16 | Stop reason: HOSPADM

## 2025-06-16 RX ORDER — SODIUM CHLORIDE 0.9 % (FLUSH) 0.9 %
5-40 SYRINGE (ML) INJECTION PRN
Status: DISCONTINUED | OUTPATIENT
Start: 2025-06-16 | End: 2025-06-16 | Stop reason: HOSPADM

## 2025-06-16 RX ORDER — DEXAMETHASONE SODIUM PHOSPHATE 10 MG/ML
INJECTION, SOLUTION INTRA-ARTICULAR; INTRALESIONAL; INTRAMUSCULAR; INTRAVENOUS; SOFT TISSUE
Status: DISCONTINUED | OUTPATIENT
Start: 2025-06-16 | End: 2025-06-16 | Stop reason: SDUPTHER

## 2025-06-16 RX ORDER — KETAMINE HCL IN NACL, ISO-OSM 20 MG/2 ML
SYRINGE (ML) INJECTION
Status: DISCONTINUED | OUTPATIENT
Start: 2025-06-16 | End: 2025-06-16 | Stop reason: SDUPTHER

## 2025-06-16 RX ORDER — HALOPERIDOL 5 MG/ML
1 INJECTION INTRAMUSCULAR
Status: DISCONTINUED | OUTPATIENT
Start: 2025-06-16 | End: 2025-06-16 | Stop reason: HOSPADM

## 2025-06-16 RX ORDER — LIDOCAINE HYDROCHLORIDE ANHYDROUS AND DEXTROSE MONOHYDRATE 5; 400 G/100ML; MG/100ML
1 INJECTION, SOLUTION INTRAVENOUS CONTINUOUS
Status: DISPENSED | OUTPATIENT
Start: 2025-06-16 | End: 2025-06-17

## 2025-06-16 RX ORDER — FENTANYL CITRATE 50 UG/ML
100 INJECTION, SOLUTION INTRAMUSCULAR; INTRAVENOUS
Status: DISCONTINUED | OUTPATIENT
Start: 2025-06-16 | End: 2025-06-16 | Stop reason: HOSPADM

## 2025-06-16 RX ORDER — LIDOCAINE HYDROCHLORIDE 10 MG/ML
1 INJECTION, SOLUTION INFILTRATION; PERINEURAL
Status: DISCONTINUED | OUTPATIENT
Start: 2025-06-16 | End: 2025-06-16 | Stop reason: HOSPADM

## 2025-06-16 RX ORDER — PROCHLORPERAZINE EDISYLATE 5 MG/ML
5 INJECTION INTRAMUSCULAR; INTRAVENOUS
Status: COMPLETED | OUTPATIENT
Start: 2025-06-16 | End: 2025-06-16

## 2025-06-16 RX ORDER — SODIUM CHLORIDE, SODIUM LACTATE, POTASSIUM CHLORIDE, CALCIUM CHLORIDE 600; 310; 30; 20 MG/100ML; MG/100ML; MG/100ML; MG/100ML
INJECTION, SOLUTION INTRAVENOUS CONTINUOUS
Status: DISCONTINUED | OUTPATIENT
Start: 2025-06-16 | End: 2025-06-16 | Stop reason: HOSPADM

## 2025-06-16 RX ORDER — KETOROLAC TROMETHAMINE 30 MG/ML
30 INJECTION, SOLUTION INTRAMUSCULAR; INTRAVENOUS EVERY 6 HOURS
Status: COMPLETED | OUTPATIENT
Start: 2025-06-16 | End: 2025-06-16

## 2025-06-16 RX ORDER — PROPOFOL 10 MG/ML
INJECTION, EMULSION INTRAVENOUS
Status: DISCONTINUED | OUTPATIENT
Start: 2025-06-16 | End: 2025-06-16 | Stop reason: SDUPTHER

## 2025-06-16 RX ORDER — SCOPOLAMINE 1 MG/3D
1 PATCH, EXTENDED RELEASE TRANSDERMAL
Status: DISCONTINUED | OUTPATIENT
Start: 2025-06-16 | End: 2025-06-17 | Stop reason: HOSPADM

## 2025-06-16 RX ORDER — ACETAMINOPHEN 325 MG/1
650 TABLET ORAL EVERY 6 HOURS
Status: DISCONTINUED | OUTPATIENT
Start: 2025-06-16 | End: 2025-06-17 | Stop reason: HOSPADM

## 2025-06-16 RX ORDER — SODIUM CHLORIDE 0.9 % (FLUSH) 0.9 %
5-40 SYRINGE (ML) INJECTION PRN
Status: DISCONTINUED | OUTPATIENT
Start: 2025-06-16 | End: 2025-06-17 | Stop reason: HOSPADM

## 2025-06-16 RX ORDER — HEPARIN SODIUM 5000 [USP'U]/ML
5000 INJECTION, SOLUTION INTRAVENOUS; SUBCUTANEOUS ONCE
Status: COMPLETED | OUTPATIENT
Start: 2025-06-16 | End: 2025-06-16

## 2025-06-16 RX ORDER — METOCLOPRAMIDE HYDROCHLORIDE 5 MG/ML
10 INJECTION INTRAMUSCULAR; INTRAVENOUS ONCE
Status: COMPLETED | OUTPATIENT
Start: 2025-06-16 | End: 2025-06-16

## 2025-06-16 RX ORDER — HYDRALAZINE HYDROCHLORIDE 20 MG/ML
10 INJECTION INTRAMUSCULAR; INTRAVENOUS
Status: DISCONTINUED | OUTPATIENT
Start: 2025-06-16 | End: 2025-06-16 | Stop reason: HOSPADM

## 2025-06-16 RX ORDER — SODIUM CHLORIDE AND POTASSIUM CHLORIDE 150; 900 MG/100ML; MG/100ML
INJECTION, SOLUTION INTRAVENOUS CONTINUOUS
Status: DISCONTINUED | OUTPATIENT
Start: 2025-06-16 | End: 2025-06-17 | Stop reason: HOSPADM

## 2025-06-16 RX ORDER — SODIUM CHLORIDE 0.9 % (FLUSH) 0.9 %
5-40 SYRINGE (ML) INJECTION EVERY 12 HOURS SCHEDULED
Status: DISCONTINUED | OUTPATIENT
Start: 2025-06-16 | End: 2025-06-17 | Stop reason: HOSPADM

## 2025-06-16 RX ORDER — NALOXONE HYDROCHLORIDE 0.4 MG/ML
INJECTION, SOLUTION INTRAMUSCULAR; INTRAVENOUS; SUBCUTANEOUS PRN
Status: DISCONTINUED | OUTPATIENT
Start: 2025-06-16 | End: 2025-06-16 | Stop reason: HOSPADM

## 2025-06-16 RX ORDER — DIPHENHYDRAMINE HYDROCHLORIDE 50 MG/ML
12.5 INJECTION, SOLUTION INTRAMUSCULAR; INTRAVENOUS
Status: DISCONTINUED | OUTPATIENT
Start: 2025-06-16 | End: 2025-06-16 | Stop reason: HOSPADM

## 2025-06-16 RX ORDER — ONDANSETRON 2 MG/ML
INJECTION INTRAMUSCULAR; INTRAVENOUS
Status: DISCONTINUED | OUTPATIENT
Start: 2025-06-16 | End: 2025-06-16 | Stop reason: SDUPTHER

## 2025-06-16 RX ORDER — SUCCINYLCHOLINE CHLORIDE 20 MG/ML
INJECTION INTRAMUSCULAR; INTRAVENOUS
Status: DISCONTINUED | OUTPATIENT
Start: 2025-06-16 | End: 2025-06-16 | Stop reason: SDUPTHER

## 2025-06-16 RX ORDER — FENTANYL CITRATE 50 UG/ML
INJECTION, SOLUTION INTRAMUSCULAR; INTRAVENOUS
Status: DISCONTINUED | OUTPATIENT
Start: 2025-06-16 | End: 2025-06-16 | Stop reason: SDUPTHER

## 2025-06-16 RX ORDER — DIPHENHYDRAMINE HYDROCHLORIDE 50 MG/ML
25 INJECTION, SOLUTION INTRAMUSCULAR; INTRAVENOUS EVERY 6 HOURS PRN
Status: DISCONTINUED | OUTPATIENT
Start: 2025-06-16 | End: 2025-06-17 | Stop reason: HOSPADM

## 2025-06-16 RX ORDER — SODIUM CHLORIDE, SODIUM LACTATE, POTASSIUM CHLORIDE, CALCIUM CHLORIDE 600; 310; 30; 20 MG/100ML; MG/100ML; MG/100ML; MG/100ML
INJECTION, SOLUTION INTRAVENOUS CONTINUOUS
Status: DISCONTINUED | OUTPATIENT
Start: 2025-06-16 | End: 2025-06-16 | Stop reason: SDUPTHER

## 2025-06-16 RX ORDER — GLYCOPYRROLATE 0.2 MG/ML
INJECTION INTRAMUSCULAR; INTRAVENOUS
Status: DISCONTINUED | OUTPATIENT
Start: 2025-06-16 | End: 2025-06-16 | Stop reason: SDUPTHER

## 2025-06-16 RX ORDER — SODIUM CHLORIDE 9 MG/ML
INJECTION, SOLUTION INTRAVENOUS PRN
Status: DISCONTINUED | OUTPATIENT
Start: 2025-06-16 | End: 2025-06-17 | Stop reason: HOSPADM

## 2025-06-16 RX ORDER — ONDANSETRON 2 MG/ML
4 INJECTION INTRAMUSCULAR; INTRAVENOUS ONCE
Status: DISCONTINUED | OUTPATIENT
Start: 2025-06-16 | End: 2025-06-16 | Stop reason: HOSPADM

## 2025-06-16 RX ORDER — DIPHENHYDRAMINE HCL 25 MG
25 CAPSULE ORAL EVERY 6 HOURS PRN
Status: DISCONTINUED | OUTPATIENT
Start: 2025-06-16 | End: 2025-06-17 | Stop reason: HOSPADM

## 2025-06-16 RX ORDER — ONDANSETRON 2 MG/ML
4 INJECTION INTRAMUSCULAR; INTRAVENOUS EVERY 6 HOURS PRN
Status: DISCONTINUED | OUTPATIENT
Start: 2025-06-16 | End: 2025-06-17 | Stop reason: HOSPADM

## 2025-06-16 RX ORDER — ROCURONIUM BROMIDE 10 MG/ML
INJECTION, SOLUTION INTRAVENOUS
Status: DISCONTINUED | OUTPATIENT
Start: 2025-06-16 | End: 2025-06-16 | Stop reason: SDUPTHER

## 2025-06-16 RX ORDER — HYDROMORPHONE HYDROCHLORIDE 1 MG/ML
0.5 INJECTION, SOLUTION INTRAMUSCULAR; INTRAVENOUS; SUBCUTANEOUS
Status: DISCONTINUED | OUTPATIENT
Start: 2025-06-16 | End: 2025-06-17 | Stop reason: HOSPADM

## 2025-06-16 RX ORDER — SODIUM CHLORIDE, SODIUM LACTATE, POTASSIUM CHLORIDE, CALCIUM CHLORIDE 600; 310; 30; 20 MG/100ML; MG/100ML; MG/100ML; MG/100ML
INJECTION, SOLUTION INTRAVENOUS
Status: DISCONTINUED | OUTPATIENT
Start: 2025-06-16 | End: 2025-06-16 | Stop reason: SDUPTHER

## 2025-06-16 RX ORDER — OXYCODONE HYDROCHLORIDE 5 MG/1
5 TABLET ORAL EVERY 4 HOURS PRN
Status: DISCONTINUED | OUTPATIENT
Start: 2025-06-16 | End: 2025-06-17 | Stop reason: HOSPADM

## 2025-06-16 RX ORDER — ACETAMINOPHEN 500 MG
1000 TABLET ORAL ONCE
Status: COMPLETED | OUTPATIENT
Start: 2025-06-16 | End: 2025-06-16

## 2025-06-16 RX ORDER — LIDOCAINE HYDROCHLORIDE 20 MG/ML
INJECTION, SOLUTION EPIDURAL; INFILTRATION; INTRACAUDAL; PERINEURAL
Status: DISCONTINUED | OUTPATIENT
Start: 2025-06-16 | End: 2025-06-16 | Stop reason: SDUPTHER

## 2025-06-16 RX ORDER — HYDRALAZINE HYDROCHLORIDE 20 MG/ML
10 INJECTION INTRAMUSCULAR; INTRAVENOUS EVERY 6 HOURS PRN
Status: DISCONTINUED | OUTPATIENT
Start: 2025-06-16 | End: 2025-06-17 | Stop reason: HOSPADM

## 2025-06-16 RX ORDER — OXYCODONE HYDROCHLORIDE 5 MG/1
5 TABLET ORAL PRN
Status: DISCONTINUED | OUTPATIENT
Start: 2025-06-16 | End: 2025-06-16 | Stop reason: HOSPADM

## 2025-06-16 RX ORDER — MIDAZOLAM HYDROCHLORIDE 2 MG/2ML
2 INJECTION, SOLUTION INTRAMUSCULAR; INTRAVENOUS
Status: DISCONTINUED | OUTPATIENT
Start: 2025-06-16 | End: 2025-06-16 | Stop reason: HOSPADM

## 2025-06-16 RX ORDER — LABETALOL HYDROCHLORIDE 5 MG/ML
10 INJECTION, SOLUTION INTRAVENOUS
Status: DISCONTINUED | OUTPATIENT
Start: 2025-06-16 | End: 2025-06-16 | Stop reason: HOSPADM

## 2025-06-16 RX ORDER — PROCHLORPERAZINE EDISYLATE 5 MG/ML
10 INJECTION INTRAMUSCULAR; INTRAVENOUS EVERY 6 HOURS PRN
Status: DISCONTINUED | OUTPATIENT
Start: 2025-06-16 | End: 2025-06-17 | Stop reason: HOSPADM

## 2025-06-16 RX ORDER — SIMETHICONE 80 MG
80 TABLET,CHEWABLE ORAL EVERY 6 HOURS PRN
Status: DISCONTINUED | OUTPATIENT
Start: 2025-06-16 | End: 2025-06-17 | Stop reason: HOSPADM

## 2025-06-16 RX ORDER — OXYCODONE HYDROCHLORIDE 5 MG/1
10 TABLET ORAL PRN
Status: DISCONTINUED | OUTPATIENT
Start: 2025-06-16 | End: 2025-06-16 | Stop reason: HOSPADM

## 2025-06-16 RX ORDER — BUPIVACAINE HYDROCHLORIDE 5 MG/ML
INJECTION, SOLUTION EPIDURAL; INTRACAUDAL; PERINEURAL PRN
Status: DISCONTINUED | OUTPATIENT
Start: 2025-06-16 | End: 2025-06-16 | Stop reason: ALTCHOICE

## 2025-06-16 RX ORDER — IPRATROPIUM BROMIDE AND ALBUTEROL SULFATE 2.5; .5 MG/3ML; MG/3ML
1 SOLUTION RESPIRATORY (INHALATION)
Status: DISCONTINUED | OUTPATIENT
Start: 2025-06-16 | End: 2025-06-16 | Stop reason: HOSPADM

## 2025-06-16 RX ADMIN — ACETAMINOPHEN 650 MG: 325 TABLET, FILM COATED ORAL at 18:04

## 2025-06-16 RX ADMIN — DEXAMETHASONE SODIUM PHOSPHATE 10 MG: 10 INJECTION INTRAMUSCULAR; INTRAVENOUS at 08:26

## 2025-06-16 RX ADMIN — FENTANYL CITRATE 100 MCG: 50 INJECTION, SOLUTION INTRAMUSCULAR; INTRAVENOUS at 09:24

## 2025-06-16 RX ADMIN — HYDROMORPHONE HYDROCHLORIDE 0.5 MG: 1 INJECTION, SOLUTION INTRAMUSCULAR; INTRAVENOUS; SUBCUTANEOUS at 10:33

## 2025-06-16 RX ADMIN — FENTANYL CITRATE 100 MCG: 50 INJECTION, SOLUTION INTRAMUSCULAR; INTRAVENOUS at 07:45

## 2025-06-16 RX ADMIN — ACETAMINOPHEN 650 MG: 325 TABLET, FILM COATED ORAL at 22:42

## 2025-06-16 RX ADMIN — GABAPENTIN 300 MG: 300 CAPSULE ORAL at 15:33

## 2025-06-16 RX ADMIN — PROPOFOL 250 MG: 10 INJECTION, EMULSION INTRAVENOUS at 07:57

## 2025-06-16 RX ADMIN — THIAMINE HYDROCHLORIDE: 100 INJECTION, SOLUTION INTRAMUSCULAR; INTRAVENOUS at 12:48

## 2025-06-16 RX ADMIN — Medication 20 MG: at 08:15

## 2025-06-16 RX ADMIN — SODIUM CHLORIDE, PRESERVATIVE FREE 10 ML: 5 INJECTION INTRAVENOUS at 22:48

## 2025-06-16 RX ADMIN — SODIUM CHLORIDE, PRESERVATIVE FREE 10 ML: 5 INJECTION INTRAVENOUS at 12:09

## 2025-06-16 RX ADMIN — HYDROMORPHONE HYDROCHLORIDE 0.5 MG: 1 INJECTION, SOLUTION INTRAMUSCULAR; INTRAVENOUS; SUBCUTANEOUS at 10:26

## 2025-06-16 RX ADMIN — KETOROLAC TROMETHAMINE 30 MG: 30 INJECTION, SOLUTION INTRAMUSCULAR at 22:42

## 2025-06-16 RX ADMIN — HEPARIN SODIUM 5000 UNITS: 5000 INJECTION INTRAVENOUS; SUBCUTANEOUS at 06:45

## 2025-06-16 RX ADMIN — SUCRALFATE 1 G: 1 TABLET ORAL at 22:42

## 2025-06-16 RX ADMIN — ACETAMINOPHEN 1000 MG: 500 TABLET, FILM COATED ORAL at 06:45

## 2025-06-16 RX ADMIN — LIDOCAINE HYDROCHLORIDE 1 MG/KG/HR: 4 INJECTION, SOLUTION INTRAVENOUS at 10:42

## 2025-06-16 RX ADMIN — KETOROLAC TROMETHAMINE 30 MG: 30 INJECTION, SOLUTION INTRAMUSCULAR at 11:52

## 2025-06-16 RX ADMIN — Medication 200 MG: at 07:57

## 2025-06-16 RX ADMIN — ROCURONIUM BROMIDE 5 MG: 10 INJECTION, SOLUTION INTRAVENOUS at 07:57

## 2025-06-16 RX ADMIN — MIDAZOLAM 2 MG: 1 INJECTION INTRAMUSCULAR; INTRAVENOUS at 07:45

## 2025-06-16 RX ADMIN — Medication 20 MG: at 07:58

## 2025-06-16 RX ADMIN — ROCURONIUM BROMIDE 35 MG: 10 INJECTION, SOLUTION INTRAVENOUS at 08:03

## 2025-06-16 RX ADMIN — SUCRALFATE 1 G: 1 TABLET ORAL at 18:04

## 2025-06-16 RX ADMIN — HEPARIN SODIUM 5000 UNITS: 5000 INJECTION INTRAVENOUS; SUBCUTANEOUS at 22:42

## 2025-06-16 RX ADMIN — ACETAMINOPHEN 650 MG: 325 TABLET, FILM COATED ORAL at 11:51

## 2025-06-16 RX ADMIN — ROCURONIUM BROMIDE 20 MG: 10 INJECTION, SOLUTION INTRAVENOUS at 08:22

## 2025-06-16 RX ADMIN — SUGAMMADEX 200 MG: 100 INJECTION, SOLUTION INTRAVENOUS at 09:57

## 2025-06-16 RX ADMIN — SODIUM CHLORIDE, SODIUM LACTATE, POTASSIUM CHLORIDE, AND CALCIUM CHLORIDE: 600; 310; 30; 20 INJECTION, SOLUTION INTRAVENOUS at 07:36

## 2025-06-16 RX ADMIN — SODIUM CHLORIDE, SODIUM LACTATE, POTASSIUM CHLORIDE, AND CALCIUM CHLORIDE: 600; 310; 30; 20 INJECTION, SOLUTION INTRAVENOUS at 08:35

## 2025-06-16 RX ADMIN — SUCRALFATE 1 G: 1 TABLET ORAL at 11:51

## 2025-06-16 RX ADMIN — ROCURONIUM BROMIDE 10 MG: 10 INJECTION, SOLUTION INTRAVENOUS at 08:16

## 2025-06-16 RX ADMIN — FOSAPREPITANT 150 MG: 150 INJECTION, POWDER, LYOPHILIZED, FOR SOLUTION INTRAVENOUS at 06:38

## 2025-06-16 RX ADMIN — Medication 3000 MG: at 07:52

## 2025-06-16 RX ADMIN — HEPARIN SODIUM 5000 UNITS: 5000 INJECTION INTRAVENOUS; SUBCUTANEOUS at 15:33

## 2025-06-16 RX ADMIN — ONDANSETRON 4 MG: 2 INJECTION, SOLUTION INTRAMUSCULAR; INTRAVENOUS at 12:08

## 2025-06-16 RX ADMIN — PROPOFOL 100 MG: 10 INJECTION, EMULSION INTRAVENOUS at 09:41

## 2025-06-16 RX ADMIN — KETOROLAC TROMETHAMINE 30 MG: 30 INJECTION, SOLUTION INTRAMUSCULAR at 18:05

## 2025-06-16 RX ADMIN — GABAPENTIN 300 MG: 300 CAPSULE ORAL at 21:06

## 2025-06-16 RX ADMIN — METOCLOPRAMIDE 10 MG: 5 INJECTION, SOLUTION INTRAMUSCULAR; INTRAVENOUS at 06:45

## 2025-06-16 RX ADMIN — GLYCOPYRROLATE 0.4 MG: 0.2 INJECTION INTRAMUSCULAR; INTRAVENOUS at 08:24

## 2025-06-16 RX ADMIN — ROCURONIUM BROMIDE 20 MG: 10 INJECTION, SOLUTION INTRAVENOUS at 08:45

## 2025-06-16 RX ADMIN — OXYCODONE 5 MG: 5 TABLET ORAL at 11:51

## 2025-06-16 RX ADMIN — LIDOCAINE HYDROCHLORIDE 100 MG: 20 INJECTION, SOLUTION EPIDURAL; INFILTRATION; INTRACAUDAL; PERINEURAL at 07:57

## 2025-06-16 RX ADMIN — ROCURONIUM BROMIDE 10 MG: 10 INJECTION, SOLUTION INTRAVENOUS at 09:41

## 2025-06-16 RX ADMIN — PHENYLEPHRINE HYDROCHLORIDE 200 MCG: 0.1 INJECTION, SOLUTION INTRAVENOUS at 09:50

## 2025-06-16 RX ADMIN — POTASSIUM CHLORIDE AND SODIUM CHLORIDE: 900; 150 INJECTION, SOLUTION INTRAVENOUS at 22:37

## 2025-06-16 RX ADMIN — PROCHLORPERAZINE EDISYLATE 5 MG: 5 INJECTION INTRAMUSCULAR; INTRAVENOUS at 10:26

## 2025-06-16 RX ADMIN — ONDANSETRON 8 MG: 2 INJECTION INTRAMUSCULAR; INTRAVENOUS at 08:26

## 2025-06-16 ASSESSMENT — PAIN DESCRIPTION - DESCRIPTORS
DESCRIPTORS: SORE

## 2025-06-16 ASSESSMENT — PAIN - FUNCTIONAL ASSESSMENT
PAIN_FUNCTIONAL_ASSESSMENT: 0-10
PAIN_FUNCTIONAL_ASSESSMENT: ADULT NONVERBAL PAIN SCALE (NPVS)
PAIN_FUNCTIONAL_ASSESSMENT: 0-10

## 2025-06-16 ASSESSMENT — PAIN DESCRIPTION - ORIENTATION
ORIENTATION: RIGHT;LEFT;ANTERIOR
ORIENTATION: ANTERIOR
ORIENTATION: ANTERIOR

## 2025-06-16 ASSESSMENT — PAIN SCALES - GENERAL
PAINLEVEL_OUTOF10: 3
PAINLEVEL_OUTOF10: 0
PAINLEVEL_OUTOF10: 3
PAINLEVEL_OUTOF10: 5
PAINLEVEL_OUTOF10: 7
PAINLEVEL_OUTOF10: 0

## 2025-06-16 ASSESSMENT — PAIN SCALES - WONG BAKER: WONGBAKER_NUMERICALRESPONSE: NO HURT

## 2025-06-16 ASSESSMENT — PAIN DESCRIPTION - LOCATION
LOCATION: ABDOMEN

## 2025-06-16 NOTE — PERIOP NOTE
TRANSFER - OUT REPORT:    Verbal report given to SIERRA Issa on Ilene Davidson  being transferred to [facility] for routine post-op       Report consisted of patient’s Situation, Background, Assessment and   Recommendations(SBAR).     Information from the following report(s) Nurse Handoff Report, Adult Overview, Surgery Report, Intake/Output, and MAR was reviewed with the receiving nurse.    Lines:   Peripheral IV 06/16/25 Left;Posterior Hand (Active)   Site Assessment Clean, dry & intact 06/16/25 1018   Line Status Flushed 06/16/25 1018   Line Care Connections checked and tightened 06/16/25 1018   Phlebitis Assessment No symptoms 06/16/25 1018   Infiltration Assessment 0 06/16/25 1018   Dressing Status Clean, dry & intact 06/16/25 1018   Dressing Type Transparent 06/16/25 1018        Opportunity for questions and clarification was provided.      Patient transported with:   O2 @ 3 liters    VTE prophylaxis orders have been written for Ilene Davidson.    Patient and family given floor number and nurses name.  Family updated re: pt status after security code verified.

## 2025-06-16 NOTE — ANESTHESIA POSTPROCEDURE EVALUATION
Department of Anesthesiology  Postprocedure Note    Patient: Ilene Davidson  MRN: [id number]  YOB: 1985  Date of evaluation: 6/16/2025    Procedure Summary       Date: 06/16/25 Room / Location: [facility] MAIN OR 04 / [facility] MAIN OR    Anesthesia Start: 0736 Anesthesia Stop: 1019    Procedures:       ERAS/ GASTRIC BYPASS MAHAMED-EN-Y LAPAROSCOPIC ROBOTIC (Abdomen)      ESOPHAGOGASTRODUODENOSCOPY (Abdomen) Diagnosis:       Morbid obesity (HCC)      Primary hypertension      (Morbid obesity (HCC) [E66.01])      (Primary hypertension [I10])    Surgeons: Jacquelin Maher MD Responsible Provider: Chinedu Gifford MD    Anesthesia Type: general ASA Status: 3            Anesthesia Type: No value filed.    Jersey Phase I: Jersey Score: 8    Jersey Phase II:      Anesthesia Post Evaluation    Patient location during evaluation: bedside  Patient participation: complete - patient participated  Level of consciousness: awake and alert  Airway patency: patent  Nausea & Vomiting: no vomiting  Cardiovascular status: hemodynamically stable  Respiratory status: acceptable  Hydration status: euvolemic  Comments: BP (!) 144/93   Pulse 81   Temp 98.4 °F (36.9 °C) (Temporal)   Resp 20   Ht 1.702 m (5' 7\")   Wt (!) 171.6 kg (378 lb 4.8 oz)   LMP 05/21/2025 (Exact Date)   SpO2 96%   BMI 59.25 kg/m²    Pain management: adequate    No notable events documented.

## 2025-06-16 NOTE — ACP (ADVANCE CARE PLANNING)
Advance Care Planning   General Advance Care Planning (ACP) Conversation    Date of Conversation: 5/14/2025  Conducted with: Patient with Decision Making Capacity  Other persons present: None    Healthcare Decision Maker:  Alf Davidson - Spouse - [phone]- NOK      Lolly Armstrong

## 2025-06-16 NOTE — ANESTHESIA PRE PROCEDURE
syringe  3,000 mg IntraVENous On Call to OR Eli Salinas PA       • sodium chloride flush 0.9 % injection 5-40 mL  5-40 mL IntraVENous 2 times per day Eli Salinas PA       • sodium chloride flush 0.9 % injection 5-40 mL  5-40 mL IntraVENous PRN Eli Salinas PA       • 0.9 % sodium chloride infusion   IntraVENous PRN Eli Salinas PA       • ondansetron (ZOFRAN) injection 4 mg  4 mg IntraVENous Once Eli Salinas PA       • scopolamine (TRANSDERM-SCOP) transdermal patch 1 patch  1 patch TransDERmal Q72H Eli Salinas PA   1 patch at 25 0645   • fosaprepitant (EMEND) 150 mg in sodium chloride 0.9 % 250 mL IVPB (NPDG2HRR)  150 mg IntraVENous Once Eli Salinas  mL/hr at 25 0638 150 mg at 25 0638   • lidocaine 1 % injection 1 mL  1 mL IntraDERmal Once PRN Chinedu Gifford MD       • fentaNYL (SUBLIMAZE) injection 100 mcg  100 mcg IntraVENous Once PRN Chinedu Gifford MD       • lactated ringers infusion   IntraVENous Continuous Chinedu Gifford MD       • sodium chloride flush 0.9 % injection 5-40 mL  5-40 mL IntraVENous 2 times per day Chinedu Gifford MD       • sodium chloride flush 0.9 % injection 5-40 mL  5-40 mL IntraVENous PRN Chinedu Gifford MD       • 0.9 % sodium chloride infusion   IntraVENous PRN Chinedu Gifford MD       • midazolam PF (VERSED) injection 2 mg  2 mg IntraVENous Once PRN Chinedu Gifford MD           Allergies:  No Known Allergies    Problem List:    Patient Active Problem List   Diagnosis Code   • Obesity affecting pregnancy in third trimester O99.213   • History of  section complicating pregnancy O34.219   • Essential hypertension I10   • Vitamin D deficiency E55.9   • Family history of diabetes mellitus Z83.3   • Encounter for long-term (current) use of medications Z79.899   • Morbid obesity with BMI of 60.0-69.9, adult (HCC) E66.01, Z68.44   • Leg swelling M79.89   • Binge eating R63.2   • Multiple acquired

## 2025-06-16 NOTE — RT PROTOCOL NOTE
authorization.    ___________________  Date    _____________________________________        ________________________  Signature of Patient or Parent (of minor patient,                  Relationship (if other than Patient)  half-way parent) if applicable. Closest Relative,  Guardian or legal Representative    _____________________________________  Witness  Legal representative is defined as person named by the court as a guardian, executor or , or having power of  specifically set forth to authorize this action.    [phone]3/02[facility] 7/14)   Consent and Release for Home Ventilator

## 2025-06-16 NOTE — OP NOTE
Operative Note      Patient: Ilene Davidson  YOB: 1985  MRN: [id number]    Date of Procedure: 6/16/2025    Pre-Op Diagnosis: Morbid obesity (HCC) [E66.01]  Primary hypertension [I10]    Post-Op Diagnosis: Same       Procedure(s):  ERAS/ GASTRIC BYPASS MAHAMED-EN-Y LAPAROSCOPIC ROBOTIC  ESOPHAGOGASTRODUODENOSCOPY  00589    Surgeon(s):  Jacquelin Maher MD    Assistant:   * No surgical staff found *      Anesthesia: General    Estimated Blood Loss (mL): Minimal    Complications: None    Specimens:   ID Type Source Tests Collected by Time Destination   A :  Tissue Stomach SURGICAL PATHOLOGY Jacquelin Maher MD 6/16/2025 0925        Implants:  * No implants in log *      Drains: * No LDAs found *    Findings: Normal appearing intra-abdominal anatomy. Negative leak test. Normal appearing gastric and esophageal mucousa without staple line bleeding.    Statement of Medical Necessity: Ilene Davidson is a 39 y.o. female who presented to the clinic with history of morbid obesity and Body mass index is 59.25 kg/m².. She failed multiple weight loss attempts meets the NIH criteria for obesity surgery. She decided for a robotic assisted laparoscopic vs open gastric bypass and EGD. We discussed specific risks of gastric bypass including but not limited to: pain, infection, bleeding, scar, excess skin, conversion to open approach, hernia, injury to adjacent organs, need for blood transfusion, thromboembolic complications, gastrointestinal leak, stricture, difficulty swallowing, need for gastrostomy/feeding tube, gastroesophageal reflux, esophagitis, need for revisional surgery, failure to lose weight or weight regain, nutritional deficiencies, heart attack, stroke, death. Patient understood and agreed to proceed.     Detailed Description of Procedure:  After informed consent was taken, patient was taken to the operating room and placed in supine position with padding in all

## 2025-06-16 NOTE — DISCHARGE INSTRUCTIONS
Bariatric Surgery Discharge Instructions    Surgeon: Dr. Jacquelin Castle    Follow up:  Follow up with your surgeon or physician assistant as previously scheduled in 2 weeks.  [facility] Surgical [facility] Loss  Office number: [phone]    Diet:  When discharged from the hospital, you may begin clear and full liquid diet plus protein supplements  Start with clear liquids and progress to full liquids as tolerated  Goals: 60 grams of protein per day, 64 ounces of fluid per day  Avoid carbonated beverages and straws, avoid excessive air swallowing when drinking/eating, minimize caffeine intake. No alcohol.    Wound care:  Surgical glue will flake off in 7-10 days; the edges of steri-strips may come up but leave them in place until your follow up appointment  May shower following surgery. It is okay to get soap and water on all wounds when showering. Do not soak wounds under water (bath, pool, hot tub) until healed about three weeks after surgery.     Activity:  No lifting more than 20 lbs for 3-4 weeks. No repetitive abdominal straining (sit-ups, push-ups, crunches, pull-ups, squats), for 3 weeks. Okay to perform normal activities of daily living and walk up stairs. Walk daily. Continue deep breathing and use incentive spirometer at home.   Return to school or work when you fee comfortable. Typically 1-2 weeks for a desk job or up to 4 weeks for a manual labor job.   You may resume driving when you have minimal pain and are not taking narcotic pain medication.     Medications:  You will have been prescribed the following medications prior to discharge:  Percocet (5mg): take one pill by mouth every 4 hours as needed for pain  Zofran (8mg): take one pill by mouth every 8 hours for nausea or vomiting  Omeprazole (40mg): take one pill by mouth daily for 90 days  Use an over the counter stool softener (Miralax) or laxative (Ducolax, milk of magnesium) if you feel constipated. You may not have a

## 2025-06-16 NOTE — INTERVAL H&P NOTE
Update History & Physical    The patient's History and Physical of May 21, robotic RYGB, EGD was reviewed with the patient and I examined the patient. There was no change. The surgical site was confirmed by the patient and me.     Plan: The risks, benefits, expected outcome, and alternative to the recommended procedure have been discussed with the patient. Patient understands and wants to proceed with the procedure.     Electronically signed by Jacquelin Castle MD on 6/16/2025 at 7:20 AM

## 2025-06-16 NOTE — CARE COORDINATION
CM note:    Chart reviewed for updates. CM met with pt at bedside, introduced role, confirmed demographics and discussed d/c planning. Pt lives with spouse in a 2 level home with 2 steps to enter. She is independent with ADL's at home and she benson snot use any DME at home. Pt is insured and has a PCP that she sees every 6 months. She is an active  in the community and is employed. No reported history of IPR/SNF/HH and OPPT. Pt has a strong local family support system. Plan is for pt to discharge home with family support pending medical clearance. CM will continue to follow up with d/c planning.     06/16/25 7250   Service Assessment   Patient Orientation Alert and Oriented   Cognition Alert   History Provided By Patient   Primary Caregiver Self   Accompanied By/Relationship Spouse at bedside   Support Systems Family Members;Spouse/Significant Other   Patient's Healthcare Decision Maker is: Legal Next of Kin  (Spouse)   PCP Verified by CM Yes   Last Visit to PCP Within last 6 months   Prior Functional Level Independent in ADLs/IADLs   Current Functional Level Independent in ADLs/IADLs   Can patient return to prior living arrangement Yes   Ability to make needs known: Good   Family able to assist with home care needs: Yes   Would you like for me to discuss the discharge plan with any other family members/significant others, and if so, who? Yes  (Spouse)   Financial Resources Other (Comment)  ([employer])   Community Resources None   Social/Functional History   Lives With Spouse   Type of Home Mobile home   Home Layout Two level   Home Access Stairs to enter with rails   Entrance Stairs - Number of Steps 2   Home Equipment None   Prior Level of Assist for ADLs Independent   Ambulation Assistance Independent   Active  Yes   Mode of Transportation Car   Occupation Full time employment   Discharge Planning   Type of Residence House   Living Arrangements Spouse/Significant Other   Current Services Prior To Admission

## 2025-06-16 NOTE — ANESTHESIA PROCEDURE NOTES
Airway  Date/Time: 6/16/2025 7:59 AM  Urgency: elective    Airway not difficult    General Information and Staff    Patient location during procedure: OR  Anesthesiologist: Chinedu Gifford MD  Resident/CRNA: Leonides Winston APRN - CRNA  Performed: resident/CRNA   Performed by: Leonides Winston APRN - CRNA  Authorized by: Chinedu Gifford MD      Indications and Patient Condition  Indications for airway management: anesthesia and airway protection  Spontaneous Ventilation: absent  Sedation level: deep  Preoxygenated: yes  Patient position: ramp  MILS not maintained throughout  Mask difficulty assessment: not attempted    Final Airway Details  Final airway type: endotracheal airway      Successful airway: ETT  Cuffed: yes   Successful intubation technique: direct laryngoscopy  Facilitating devices/methods: intubating stylet  Endotracheal tube insertion site: oral  Blade: Mckeon  Blade size: #2  ETT size (mm): 8.0  Cormack-Lehane Classification: grade IIb - view of arytenoids or posterior of glottis only  Placement verified by: chest auscultation and capnometry   Measured from: teeth  ETT to teeth (cm): 21  Number of attempts at approach: 1  Ventilation between attempts: bag mask  Number of other approaches attempted: 0    Additional Comments  R  no

## 2025-06-17 VITALS
BODY MASS INDEX: 45.99 KG/M2 | DIASTOLIC BLOOD PRESSURE: 82 MMHG | SYSTOLIC BLOOD PRESSURE: 122 MMHG | HEIGHT: 67 IN | OXYGEN SATURATION: 96 % | TEMPERATURE: 97.9 F | RESPIRATION RATE: 16 BRPM | HEART RATE: 62 BPM | WEIGHT: 293 LBS

## 2025-06-17 LAB
ANION GAP SERPL CALC-SCNC: 13 MMOL/L (ref 7–16)
BASOPHILS # BLD: 0.01 K/UL (ref 0–0.2)
BASOPHILS NFR BLD: 0.2 % (ref 0–2)
BUN SERPL-MCNC: 9 MG/DL (ref 6–23)
CALCIUM SERPL-MCNC: 8.8 MG/DL (ref 8.8–10.2)
CHLORIDE SERPL-SCNC: 104 MMOL/L (ref 98–107)
CO2 SERPL-SCNC: 20 MMOL/L (ref 20–29)
CREAT SERPL-MCNC: 0.54 MG/DL (ref 0.6–1.1)
DIFFERENTIAL METHOD BLD: ABNORMAL
EOSINOPHIL # BLD: 0.01 K/UL (ref 0–0.8)
EOSINOPHIL NFR BLD: 0.2 % (ref 0.5–7.8)
ERYTHROCYTE [DISTWIDTH] IN BLOOD BY AUTOMATED COUNT: 15.4 % (ref 11.9–14.6)
GLUCOSE SERPL-MCNC: 108 MG/DL (ref 70–99)
HCT VFR BLD AUTO: 34.8 % (ref 35.8–46.3)
HGB BLD-MCNC: 10.8 G/DL (ref 11.7–15.4)
IMM GRANULOCYTES # BLD AUTO: 0.01 K/UL (ref 0–0.5)
IMM GRANULOCYTES NFR BLD AUTO: 0.2 % (ref 0–5)
LYMPHOCYTES # BLD: 1.15 K/UL (ref 0.5–4.6)
LYMPHOCYTES NFR BLD: 20.4 % (ref 13–44)
MCH RBC QN AUTO: 25.1 PG (ref 26.1–32.9)
MCHC RBC AUTO-ENTMCNC: 31 G/DL (ref 31.4–35)
MCV RBC AUTO: 80.9 FL (ref 82–102)
MONOCYTES # BLD: 0.25 K/UL (ref 0.1–1.3)
MONOCYTES NFR BLD: 4.4 % (ref 4–12)
NEUTS SEG # BLD: 4.2 K/UL (ref 1.7–8.2)
NEUTS SEG NFR BLD: 74.6 % (ref 43–78)
NRBC # BLD: 0 K/UL (ref 0–0.2)
PLATELET # BLD AUTO: 221 K/UL (ref 150–450)
PMV BLD AUTO: 11.3 FL (ref 9.4–12.3)
POTASSIUM SERPL-SCNC: 4.4 MMOL/L (ref 3.5–5.1)
RBC # BLD AUTO: 4.3 M/UL (ref 4.05–5.2)
SODIUM SERPL-SCNC: 137 MMOL/L (ref 136–145)
WBC # BLD AUTO: 5.6 K/UL (ref 4.3–11.1)

## 2025-06-17 PROCEDURE — 36415 COLL VENOUS BLD VENIPUNCTURE: CPT

## 2025-06-17 PROCEDURE — 85025 COMPLETE CBC W/AUTO DIFF WBC: CPT

## 2025-06-17 PROCEDURE — 6360000002 HC RX W HCPCS: Performed by: PHYSICIAN ASSISTANT

## 2025-06-17 PROCEDURE — 6370000000 HC RX 637 (ALT 250 FOR IP): Performed by: PHYSICIAN ASSISTANT

## 2025-06-17 PROCEDURE — 2500000003 HC RX 250 WO HCPCS: Performed by: PHYSICIAN ASSISTANT

## 2025-06-17 PROCEDURE — 94760 N-INVAS EAR/PLS OXIMETRY 1: CPT

## 2025-06-17 PROCEDURE — APPSS30 APP SPLIT SHARED TIME 16-30 MINUTES: Performed by: PHYSICIAN ASSISTANT

## 2025-06-17 PROCEDURE — 80048 BASIC METABOLIC PNL TOTAL CA: CPT

## 2025-06-17 RX ADMIN — POTASSIUM CHLORIDE AND SODIUM CHLORIDE: 900; 150 INJECTION, SOLUTION INTRAVENOUS at 06:35

## 2025-06-17 RX ADMIN — GABAPENTIN 300 MG: 300 CAPSULE ORAL at 07:41

## 2025-06-17 RX ADMIN — ACETAMINOPHEN 650 MG: 325 TABLET, FILM COATED ORAL at 05:37

## 2025-06-17 RX ADMIN — SODIUM CHLORIDE, PRESERVATIVE FREE 10 ML: 5 INJECTION INTRAVENOUS at 07:45

## 2025-06-17 RX ADMIN — SUCRALFATE 1 G: 1 TABLET ORAL at 05:37

## 2025-06-17 RX ADMIN — PANTOPRAZOLE SODIUM 40 MG: 40 INJECTION, POWDER, FOR SOLUTION INTRAVENOUS at 07:41

## 2025-06-17 NOTE — CARE COORDINATION
CM note:    Chart reviewed for updates. Pt is discharging today. No therapy needs identified. Family to transport. No further CM needs.     06/17/25 0957   Services At/After Discharge   Transition of Care Consult (CM Consult) Discharge Planning   Services At/After Discharge None    Resource Information Provided? No   Mode of Transport at Discharge Other (see comment)  (Spouse)   Confirm Follow Up Transport Family   Condition of Participation: Discharge Planning   The Plan for Transition of Care is related to the following treatment goals: Pt is dischraging home with family support   The Patient and/or Patient Representative was provided with a Choice of Provider? Patient   The Patient and/Or Patient Representative agree with the Discharge Plan? Yes     TIMMY Avina

## 2025-06-17 NOTE — PROGRESS NOTES
4 Eyes Skin Assessment     NAME:  Ilene Davidson  YOB: 1985  MEDICAL RECORD NUMBER:  [id number]    The patient is being assessed for  Post-Op Surgical    I agree that at least one RN has performed a thorough Head to Toe Skin Assessment on the patient. ALL assessment sites listed below have been assessed.      Areas assessed by both nurses:    Head, Face, Ears, Shoulders, Back, Chest, Arms, Elbows, Hands, Sacrum. Buttock, Coccyx, Ischium, Legs. Feet and Heels, and Under Medical Devices         Does the Patient have a Wound? No noted wound(s)       Antoine Prevention initiated by RN: Yes  Wound Care Orders initiated by RN: No    Pressure Injury (Stage 3,4, Unstageable, DTI, NWPT, and Complex wounds) if present, place Wound referral order by RN under : No    New Ostomies, if present place, Ostomy referral order under : No     Nurse 1 eSignature: Electronically signed by WENDY BARNES RN on 6/16/25 at 11:25 AM EDT    **SHARE this note so that the co-signing nurse can place an eSignature**    Nurse 2 eSignature: {Esignature:[id number]}    
ACUTE PHYSICAL THERAPY GOALS:   (Developed with and agreed upon by patient and/or caregiver.)  STG:  (1.)Ms. Davidson will move from supine to sit and sit to supine  with SUPERVISION within 1 treatment day(s).    (2.)Ms. Davidson will transfer from bed to chair and chair to bed with SUPERVISION using the least restrictive device within 1 treatment day(s).    (3.)Ms. Davidson will ambulate with SUPERVISION for 650 feet with the least restrictive device within one treatment day(s).     LTG:  (1.)Ms. Davidson will move from supine to sit and sit to supine  in bed with INDEPENDENT within 1-2 treatment day(s).    (2.)Ms. Davidson will transfer from bed to chair and chair to bed with INDEPENDENT using the least restrictive device within 1-2 treatment day(s).    (3.)Ms. Davidson will ambulate with INDEPENDENT for 650+ feet with the least restrictive device within 1-2 treatment day(s).  ________________________________________________________________________________________________      PHYSICAL THERAPY Initial Assessment, Daily Note, and PM  (Link to Caseload Tracking: PT Visit Days : 1  Acknowledge Orders  Time In/Out  PT Charge Capture  Rehab Caseload Tracker    Ilene Davidson is a 39 y.o. female   PRIMARY DIAGNOSIS: Morbid obesity (HCC)  Morbid obesity (HCC) [E66.01]  Primary hypertension [I10]  Procedure(s) (LRB):  ERAS/ GASTRIC BYPASS MAHAMED-EN-Y LAPAROSCOPIC ROBOTIC (N/A)  ESOPHAGOGASTRODUODENOSCOPY (N/A)  * Day of Surgery *  Reason for Referral: Other abnormalities of gait and mobility (R26.89)  Inpatient: Payor: GRAEME / Plan: GRAEME PPO / Product Type: *No Product type* /     ASSESSMENT:     REHAB RECOMMENDATIONS:   Recommendation to date pending progress:  Setting:  No further skilled physical therapy after discharge from hospital    Equipment:    None     ASSESSMENT:  Ms. Stuart admitted with above diagnosis. Pt ambulating in room and hallway. Pt performed exercises sitting edge of bed. Pt 
Pt received to Rm 334. Pt alert and oriented. Oriented pt to room and bed controls. Family at bedside.  
Spiritual Health Progress Note  [facility]      Room # MOR/PL    Name: Ilene Davidson           Age: 39 y.o.    Gender: female          MRN: [id number]  Nondenominational: Faith       Preferred Language: English      Date: 06/16/25  Visit Time: Begin Time: 0630 End Time : 0645         Visit Summary: Pre-assessment request for prayer before surgery.  Patient is Faith in nona.  Prayer and support given to patient and family.  Will continue to follow as needed.       Encounter Overview/Reason: Initial Encounter, Pre-Op  Encounter Code:     Crisis (if applicable):    Service Provided For: Patient and family together     Patient was available.    Nona, Belief, Meaning:   Patient identifies as spiritual  Family/Friends identifies as spiritual  Rituals (if applicable)      Importance and Influence:  Patient has spiritual/personal beliefs that influence decisions regarding their health  Family/Friends has spiritual/personal beliefs that influence decisions regarding the patient's health    Community:  Patient   is connected with a spiritual community  Family/Friends   is connected with a spiritual community    Assessment and Plan of Care:   Emotions Expressed by Patient:   Assessment: Calm    Interventions by :   Intervention: Active listening, Prayer (assurance of)/[place]     Result/ Response by Patient:   Outcome: Comfort    Patient Plan of Care:         Emotions Expressed by Spouse/Family/Friends:   calm     Interventions with Spouse/ Family/Friends include:   active listening  prayer    Spouse/Family/Friends Plan of Care:   No spiritual needs identified for follow-up.  Spiritual care available upon referral.      Electronically signed by   
TRANSFER - IN REPORT:    Verbal report received from Zuly howe Ilene Davidson  being received from PACU for routine post-op      Report consisted of patient's Situation, Background, Assessment and   Recommendations(SBAR).     Information from the following report(s) Nurse Handoff Report was reviewed with the receiving nurse.    Opportunity for questions and clarification was provided.      Assessment completed upon patient's arrival to unit and care assumed.     
Eli Salinas PA        0.9 % sodium chloride infusion   IntraVENous PRN Eli Salinas PA        acetaminophen (TYLENOL) tablet 650 mg  650 mg Oral Q6H Eli Salinas PA   650 mg at 06/17/25 0537    oxyCODONE (ROXICODONE) immediate release tablet 5 mg  5 mg Oral Q4H PRN Eli Salinas PA   5 mg at 06/16/25 1151    HYDROmorphone HCl PF (DILAUDID) injection 0.5 mg  0.5 mg IntraVENous Q3H PRN Eil Salinas PA        ondansetron (ZOFRAN) injection 4 mg  4 mg IntraVENous Q6H PRN Eli Salinas PA   4 mg at 06/16/25 1208    prochlorperazine (COMPAZINE) injection 10 mg  10 mg IntraVENous Q6H PRN Eli Salinas PA        scopolamine (TRANSDERM-SCOP) transdermal patch 1 patch  1 patch TransDERmal Q72H Eli Salinas PA   1 patch at 06/16/25 0645    pantoprazole (PROTONIX) 40 mg in sodium chloride (PF) 0.9 % 10 mL injection  40 mg IntraVENous Daily Eli Salinas PA   40 mg at 06/17/25 0741    hydrALAZINE (APRESOLINE) injection 10 mg  10 mg IntraVENous Q6H PRN Eli Salinas PA        heparin (porcine) injection 5,000 Units  5,000 Units SubCUTAneous 3 times per day Eli Salinas PA   5,000 Units at 06/16/25 2242    gabapentin (NEURONTIN) capsule 300 mg  300 mg Oral TID Eli Salinas PA   300 mg at 06/17/25 0741    simethicone (MYLICON) chewable tablet 80 mg  80 mg Oral Q6H PRN Eli Salinas PA        sucralfate (CARAFATE) tablet 1 g  1 g Oral 4 times per day Eli Salinas PA   1 g at 06/17/25 0537       ALLERGIES:       No Known Allergies    I/O:      Intake/Output Summary (Last 24 hours) at 6/17/2025 0754  Last data filed at 6/17/2025 0750  Gross per 24 hour   Intake 2933.27 ml   Output 1360 ml   Net 1573.27 ml       Physical Examination:     /82   Pulse 57   Temp 97.9 °F (36.6 °C)   Resp 16   Ht 1.702 m (5' 7\")   Wt (!) 173.7 kg (383 lb)   LMP 05/21/2025 (Exact Date)   SpO2 96%   BMI 59.99 kg/m²

## 2025-06-18 ENCOUNTER — TELEPHONE (OUTPATIENT)
Dept: PRIMARY CARE CLINIC | Facility: CLINIC | Age: 40
End: 2025-06-18

## 2025-06-30 ENCOUNTER — OFFICE VISIT (OUTPATIENT)
Dept: SURGERY | Age: 40
End: 2025-06-30

## 2025-06-30 VITALS
WEIGHT: 293 LBS | BODY MASS INDEX: 45.99 KG/M2 | HEART RATE: 69 BPM | DIASTOLIC BLOOD PRESSURE: 95 MMHG | SYSTOLIC BLOOD PRESSURE: 148 MMHG | HEIGHT: 67 IN

## 2025-06-30 DIAGNOSIS — E66.01 OBESITY, MORBID, BMI 50 OR HIGHER (HCC): ICD-10-CM

## 2025-06-30 DIAGNOSIS — R10.9 ABDOMINAL DISCOMFORT: ICD-10-CM

## 2025-06-30 DIAGNOSIS — Z71.3 NUTRITIONAL COUNSELING: ICD-10-CM

## 2025-06-30 DIAGNOSIS — T78.40XA ALLERGIC REACTION, INITIAL ENCOUNTER: ICD-10-CM

## 2025-06-30 DIAGNOSIS — Z98.84 S/P GASTRIC BYPASS: ICD-10-CM

## 2025-06-30 DIAGNOSIS — Z71.82 EXERCISE COUNSELING: ICD-10-CM

## 2025-06-30 DIAGNOSIS — E66.01 MORBID OBESITY (HCC): Primary | ICD-10-CM

## 2025-06-30 PROCEDURE — 99024 POSTOP FOLLOW-UP VISIT: CPT | Performed by: PHYSICIAN ASSISTANT

## 2025-06-30 NOTE — PROGRESS NOTES
[facility] NUTRITION REASSESSMENT    Assessment:  Pt is 2 weeks s/p RYGB.    Pt is doing well with full liquid diet compliance.    Intake of ~60g protein/d and ~70oz fluid/d.   Pt is eating protein shakes and pudding; and drinking water and protein shakes.    Pt is exercising via walking.    Pt is taking MVI and Ca supplements as recommended.  Pt reports no food related issues.     Intervention:   Discussed food choices and meal ideas on pureed/smooth diet, once released by surgeon.   Encouraged pt to follow mindful eating behaviors, lean protein focus followed by non-starchy vegetables as able.  Encouraged pt use protein supplements throughout the day as snacks rather than as meal replacement.    Encouraged continued and increased activity.      Monitoring and Evaluation:  Monitor for continued safe, supervised weight loss for RYGB.     Monitor pt for meeting protein requirements of 60-80g/d, 64+ fl oz fluids.  Follow for tolerance of pureed/smooth diet.   F/U per MD Christal Colon MBA. RD, LD

## 2025-06-30 NOTE — PROGRESS NOTES
Eli Salinas PA-C  [facility]      Date of visit: 2025          Name: Ilene Davidson      MRN: [id number]       : 1985       Sex: female  PCP: Kori Karimi MD     Surgeon: Dr. Jcaquelin Castle  Procedure: robotic assisted gastric bypass    Surgeon: Zander   DOS: 2025   Procedure: Bypass   Pre-op weight: 387   Ideal body weight: 154   Excess body weight: 233     Weight History Graph  Last Surgical Weight Loss:      2025    12:25 PM   Surgical Weight Loss Tracker   Date 2025   Height 5' 6\"   Initial Weight 396 lb   Initial BMI 63.9   Ideal Body Weight 154 lb   Initial Excess Body Weight (EBW) 242 lb   Surgery Date 2025   Pre-Surgical Weight 387 lb   Pre Surgery BMI 62.5   Preop Weight Change 9 lb   Preop % Weight Change 2%   Pre-Surgical EBW 14 lb 9 oz   Date 2025   Weight 361 lb   BMI 58.3   Wt Change Since Last Visit -26 lb   Total Wt Change Since Surgery -26 lb   % EBWL 11%   % Total Body Wt Loss 7%      Subjective   2 weeks post-op visit after a robotic assisted gastric bypass was done on 2025.   She has lost 26 lbs since her last office visit.    Clinical Assessment:    She is doing very well today and is feeling good.  She reports that she has been adhering to the liquid diet without difficulty but does admit to eating cucumber and tomato salad.  She notes occasional abdominal pain on the left side with certain movements.  She denies any nausea, vomiting or heartburn.  She denies fevers or chills, or any redness or drainage from the incision sites.  She did experience redness around all of the incisions secondary to Dermabond reaction which has resolved since removing the glue.  She does not report having problems with constipation.  Her pain is well controlled and is not taking pain medications.  She has been taking the PPI without difficulty.  She has been doing her protein shakes without

## 2025-07-15 ENCOUNTER — OFFICE VISIT (OUTPATIENT)
Dept: SURGERY | Age: 40
End: 2025-07-15

## 2025-07-15 VITALS
HEART RATE: 69 BPM | DIASTOLIC BLOOD PRESSURE: 91 MMHG | BODY MASS INDEX: 45.99 KG/M2 | SYSTOLIC BLOOD PRESSURE: 148 MMHG | WEIGHT: 293 LBS | HEIGHT: 67 IN

## 2025-07-15 DIAGNOSIS — Z98.84 S/P GASTRIC BYPASS: ICD-10-CM

## 2025-07-15 DIAGNOSIS — R05.2 SUBACUTE COUGH: ICD-10-CM

## 2025-07-15 DIAGNOSIS — Z71.82 EXERCISE COUNSELING: ICD-10-CM

## 2025-07-15 DIAGNOSIS — Z71.3 NUTRITIONAL COUNSELING: ICD-10-CM

## 2025-07-15 DIAGNOSIS — E66.01 OBESITY, MORBID, BMI 50 OR HIGHER (HCC): ICD-10-CM

## 2025-07-15 DIAGNOSIS — E66.01 MORBID OBESITY (HCC): Primary | ICD-10-CM

## 2025-07-15 PROCEDURE — 99024 POSTOP FOLLOW-UP VISIT: CPT | Performed by: PHYSICIAN ASSISTANT

## 2025-07-15 NOTE — PROGRESS NOTES
Eli Salinas PA-C  [facility]      Date of visit: 7/15/2025          Name: Ilene Davidson      MRN: [id number]       : 1985       Sex: female  PCP: Kori Karimi MD     Surgeon: Dr. Jacquelin Castle  Procedure: robotic assisted gastric bypass     Surgeon: Zander   DOS: 2025   Procedure: Bypass   Pre-op weight: 387   Ideal body weight: 154   Excess body weight: 233      Weight History Graph  Last Surgical Weight Loss:      7/15/2025     5:19 AM 2025    12:25 PM   Surgical Weight Loss Tracker   Date 2025   Height 5' 6\" 5' 6\"   Initial Weight 396 lb 396 lb   Initial BMI 63.9 63.9   Ideal Body Weight 154 lb 154 lb   Initial Excess Body Weight (EBW) 242 lb 242 lb   Surgery Date 2025   Pre-Surgical Weight 387 lb 387 lb   Pre Surgery BMI 62.5 62.5   Preop Weight Change 9 lb 9 lb   Preop % Weight Change 2% 2%   Pre-Surgical EBW 14 lb 9 oz 14 lb 9 oz   Date 7/15/2025 2025   Weight 351 lb 361 lb   BMI 56.6 58.3   Wt Change Since Last Visit -10 lb -26 lb   Total Wt Change Since Surgery -36 lb -26 lb   % EBWL 15% 11%   % Total Body Wt Loss 9% 7%      Subjective   4 weeks post-op visit after a robotic assisted gastric bypass was done on 2025.   She has lost 10 lbs since her last office visit.    Clinical Assessment:    She is doing very well today and is feeling good.  She reports that she has been adhering to the pureed / smooth diet without difficulty.  She denies any abdominal pain, nausea or vomiting or heartburn.  She denies fevers or chills, or any redness or drainage from the incision sites.  She does not report having problems with constipation.  Her pain is well controlled and is not taking pain medications.  She has been taking the PPI without difficulty.  She has been doing her protein shakes without difficulty. She reports a persistent and mildly worsening cough without production

## 2025-07-15 NOTE — PROGRESS NOTES
[facility] NUTRITION REASSESSMENT    Assessment:  Pt is 4 weeks s/p RYGB.    Pt is doing well with pureed.smooth diet compliance.    Intake of ~80g protein/d and ~70oz fluid/d.   Pt is eating ricotta bake, yogurt, and shakes; and drinking water, gatorade zero and shakes.    Pt is exercising via walking.  Pt is taking MVI and Ca supplements as recommended.  Pt reports no food related issues.     Intervention:   Discussed food choices and meal ideas on soft diet, once released by surgeon.   Encouraged pt to follow mindful eating behaviors, lean protein focus followed by non-starchy vegetables as able.  Encouraged pt use protein supplements throughout the day as snacks rather than as meal replacement.    Encouraged continued and increased activity.      Monitoring and Evaluation:  Monitor for continued safe, supervised weight loss for RYGB.     Monitor pt for meeting protein requirements of 60-80g/d, 64+ fl oz fluids.  Follow for tolerance of soft diet.   F/U per MD Christal Colon MBA. RD, LD

## 2025-07-29 ENCOUNTER — PATIENT MESSAGE (OUTPATIENT)
Dept: PRIMARY CARE CLINIC | Facility: CLINIC | Age: 40
End: 2025-07-29

## 2025-07-29 DIAGNOSIS — E66.01 MORBID OBESITY WITH BMI OF 60.0-69.9, ADULT (HCC): Primary | ICD-10-CM

## 2025-07-30 ENCOUNTER — OFFICE VISIT (OUTPATIENT)
Dept: SURGERY | Age: 40
End: 2025-07-30

## 2025-07-30 VITALS
HEIGHT: 67 IN | WEIGHT: 293 LBS | HEART RATE: 67 BPM | DIASTOLIC BLOOD PRESSURE: 87 MMHG | BODY MASS INDEX: 45.99 KG/M2 | SYSTOLIC BLOOD PRESSURE: 140 MMHG

## 2025-07-30 DIAGNOSIS — E66.01 MORBID OBESITY (HCC): ICD-10-CM

## 2025-07-30 DIAGNOSIS — Z71.82 EXERCISE COUNSELING: ICD-10-CM

## 2025-07-30 DIAGNOSIS — E66.01 MORBID OBESITY (HCC): Primary | ICD-10-CM

## 2025-07-30 DIAGNOSIS — Z98.84 S/P GASTRIC BYPASS: ICD-10-CM

## 2025-07-30 DIAGNOSIS — Z71.3 NUTRITIONAL COUNSELING: ICD-10-CM

## 2025-07-30 DIAGNOSIS — Z13.21 ENCOUNTER FOR VITAMIN DEFICIENCY SCREENING: ICD-10-CM

## 2025-07-30 DIAGNOSIS — E66.01 MORBID OBESITY WITH BMI OF 60.0-69.9, ADULT (HCC): ICD-10-CM

## 2025-07-30 DIAGNOSIS — E66.01 OBESITY, MORBID, BMI 50 OR HIGHER (HCC): ICD-10-CM

## 2025-07-30 LAB
ALBUMIN SERPL-MCNC: 3.8 G/DL (ref 3.5–5)
ALBUMIN/GLOB SERPL: 1.2 (ref 1–1.9)
ALP SERPL-CCNC: 71 U/L (ref 35–104)
ALT SERPL-CCNC: 34 U/L (ref 8–45)
ANION GAP SERPL CALC-SCNC: 14 MMOL/L (ref 7–16)
AST SERPL-CCNC: 29 U/L (ref 15–37)
BASOPHILS # BLD: 0.03 K/UL (ref 0–0.2)
BASOPHILS NFR BLD: 0.4 % (ref 0–2)
BILIRUB SERPL-MCNC: 0.5 MG/DL (ref 0–1.2)
BUN SERPL-MCNC: 9 MG/DL (ref 6–23)
CALCIUM SERPL-MCNC: 9.3 MG/DL (ref 8.8–10.2)
CHLORIDE SERPL-SCNC: 100 MMOL/L (ref 98–107)
CO2 SERPL-SCNC: 23 MMOL/L (ref 20–29)
CREAT SERPL-MCNC: 0.47 MG/DL (ref 0.6–1.1)
DIFFERENTIAL METHOD BLD: ABNORMAL
EOSINOPHIL # BLD: 0.29 K/UL (ref 0–0.8)
EOSINOPHIL NFR BLD: 4.3 % (ref 0.5–7.8)
ERYTHROCYTE [DISTWIDTH] IN BLOOD BY AUTOMATED COUNT: 15.9 % (ref 11.9–14.6)
EST. AVERAGE GLUCOSE BLD GHB EST-MCNC: 107 MG/DL
FOLATE SERPL-MCNC: 15.3 NG/ML (ref 3.1–17.5)
GLOBULIN SER CALC-MCNC: 3.2 G/DL (ref 2.3–3.5)
GLUCOSE SERPL-MCNC: 83 MG/DL (ref 70–99)
HBA1C MFR BLD: 5.4 % (ref 0–5.6)
HCT VFR BLD AUTO: 36.2 % (ref 35.8–46.3)
HGB BLD-MCNC: 11.3 G/DL (ref 11.7–15.4)
IMM GRANULOCYTES # BLD AUTO: 0.02 K/UL (ref 0–0.5)
IMM GRANULOCYTES NFR BLD AUTO: 0.3 % (ref 0–5)
IRON SERPL-MCNC: 29 UG/DL (ref 35–100)
LYMPHOCYTES # BLD: 2.53 K/UL (ref 0.5–4.6)
LYMPHOCYTES NFR BLD: 37.4 % (ref 13–44)
MCH RBC QN AUTO: 24.4 PG (ref 26.1–32.9)
MCHC RBC AUTO-ENTMCNC: 31.2 G/DL (ref 31.4–35)
MCV RBC AUTO: 78.2 FL (ref 82–102)
MONOCYTES # BLD: 0.35 K/UL (ref 0.1–1.3)
MONOCYTES NFR BLD: 5.2 % (ref 4–12)
NEUTS SEG # BLD: 3.55 K/UL (ref 1.7–8.2)
NEUTS SEG NFR BLD: 52.4 % (ref 43–78)
NRBC # BLD: 0 K/UL (ref 0–0.2)
PLATELET # BLD AUTO: 251 K/UL (ref 150–450)
PMV BLD AUTO: 12.6 FL (ref 9.4–12.3)
POTASSIUM SERPL-SCNC: 4.4 MMOL/L (ref 3.5–5.1)
PROT SERPL-MCNC: 7 G/DL (ref 6.3–8.2)
RBC # BLD AUTO: 4.63 M/UL (ref 4.05–5.2)
SODIUM SERPL-SCNC: 137 MMOL/L (ref 136–145)
WBC # BLD AUTO: 6.8 K/UL (ref 4.3–11.1)

## 2025-07-30 PROCEDURE — 99024 POSTOP FOLLOW-UP VISIT: CPT | Performed by: PHYSICIAN ASSISTANT

## 2025-07-30 NOTE — PROGRESS NOTES
[facility] NUTRITION REASSESSMENT    Assessment:  Pt is 6 weeks s/p RYGB.    Pt is doing well with soft diet compliance.    Intake of ~80g protein/d and ~70oz fluid/d.   Pt is eating salmon, eggs, shakes; and drinking water and shakes.    Pt is exercising via elliptical, swimming, and walking.   Pt is taking MVI and Ca supplements as recommended.  Pt reports vomiting once with salmon.     Intervention:   Discussed food choices and meal ideas on soft diet, once released by surgeon.   Encouraged pt to follow mindful eating behaviors, lean protein focus followed by non-starchy vegetables as able.  Encouraged pt use protein supplements throughout the day as snacks rather than as meal replacement.    Encouraged continued and increased activity.      Monitoring and Evaluation:  Monitor for continued safe, supervised weight loss for RYGB.     Monitor pt for meeting protein requirements of 60-80g/d, 64+ fl oz fluids.  Follow for tolerance of soft diet.   F/U per MD Christal Colon MBA. RD, LD

## 2025-07-30 NOTE — PROGRESS NOTES
Eli Salinas PA-C  [facility]      Date of visit: 2025          Name: Ilene Davidson      MRN: [id number]       : 1985       Sex: female  PCP: Kori Karimi MD     Surgeon: Dr. Jacquelin Castle  Procedure: robotic assisted gastric bypass     Surgeon: Zander   DOS: 2025   Procedure: Bypass   Pre-op weight: 387   Ideal body weight: 154   Excess body weight: 233      Weight History Graph  Last Surgical Weight Loss:      2025     2:41 PM 7/15/2025     5:19 AM 2025    12:25 PM   Surgical Weight Loss Tracker   Date  2025   Height  5' 6\" 5' 6\"   Initial Weight  396 lb 396 lb   Initial BMI  63.9 63.9   Ideal Body Weight  154 lb 154 lb   Initial Excess Body Weight (EBW)  242 lb 242 lb   Surgery Date  2025   Pre-Surgical Weight  387 lb 387 lb   Pre Surgery BMI  62.5 62.5   Preop Weight Change  9 lb 9 lb   Preop % Weight Change  2% 2%   Pre-Surgical EBW  14 lb 9 oz 14 lb 9 oz   Date 2025 7/15/2025 2025   Weight 348 lb 351 lb 361 lb   BMI  56.6 58.3   Wt Change Since Last Visit -3 lb -10 lb -26 lb   Total Wt Change Since Surgery -39 lb -36 lb -26 lb   % EBWL 17% 15% 11%   % Total Body Wt Loss  9% 7%      Subjective   6 weeks post-op visit after a robotic assisted gastric bypass was done on 2025.   She has lost 3lbs since her last office visit.    Clinical Assessment:    She is doing very well today and is feeling good.  She reports that she has been adhering to the soft diet without difficulty.  She notes one instance of vomiting yesterday due to seasoning or eating too quickly.  She denies any abdominal pain, nausea or heartburn.  She denies fevers or chills, or any redness or drainage from the incision sites.  She does not report having problems with constipation.  Her pain is well controlled and is not taking pain medications.  She has been taking the PPI without difficulty.

## 2025-08-01 LAB — VIT B1 BLD-SCNC: 93.3 NMOL/L (ref 66.5–200)

## (undated) DEVICE — GLOVE SURG SZ 65 THK91MIL LTX FREE SYN POLYISOPRENE

## (undated) DEVICE — STAPLER 60: Brand: SUREFORM

## (undated) DEVICE — CONNECTOR TBNG WHT PLAS SUCT STR 5IN1 LTWT W/ M CONN

## (undated) DEVICE — APPLICATOR LAP 35 CM 2 RIGID VISTASEAL

## (undated) DEVICE — SUTURE STRATAFIX SYMMETRIC SZ 1 L18IN ABSRB VLT CT1 L36CM SXPP1A404

## (undated) DEVICE — AIRSEAL 8 MM CANNULA CAP AND OBTURATOR WITH BLADELESS OPTICAL TIP COMPATIBLE WITH INTUITIVE DA VINCI XI AND DA VINCI X 8 MM INSTRUMENT CANNULA, STANDARD LENGTH: Brand: AIRSEAL

## (undated) DEVICE — AIRLIFE™ OXYGEN TUBING 7 FEET (2.1 M) CRUSH RESISTANT OXYGEN TUBING, VINYL TIPPED: Brand: AIRLIFE™

## (undated) DEVICE — SYSTEM POS SZ 36 X 20 X 1 IN INTEGR ADJ ARM PROTECTOR LIFT

## (undated) DEVICE — SUTURE VICRYL + SZ 0 L27IN ABSRB VLT L26MM UR-6 5/8 CIR VCP603H

## (undated) DEVICE — GLOVE SURG SZ 7 L12IN FNGR THK79MIL GRN LTX FREE

## (undated) DEVICE — ARM DRAPE

## (undated) DEVICE — GENERAL ROBOTIC: Brand: MEDLINE INDUSTRIES, INC.

## (undated) DEVICE — STAPLER 60 RELOAD BLUE: Brand: SUREFORM

## (undated) DEVICE — FORCEPS BX L240CM JAW DIA2.8MM L CAP W/ NDL MIC MESH TOOTH

## (undated) DEVICE — SYRINGE MED 30ML STD CLR PLAS LUERLOCK TIP N CTRL DISP

## (undated) DEVICE — SYRINGE MED 10ML LUERLOCK TIP W/O SFTY DISP

## (undated) DEVICE — AIRSEAL BIFURCATED FILTERED TUBESET WITH ACTIVATED CHARCOAL FILTER: Brand: AIRSEAL

## (undated) DEVICE — VESSEL SEALER EXTEND: Brand: ENDOWRIST

## (undated) DEVICE — GUIDEWIRE WITH SPRING TIP - SINGLE USE: Brand: SAFEGUIDE®

## (undated) DEVICE — BINDER ABD M/L H12IN FOR 46-62IN WHT 4 SLD PNL DSGN HOOP

## (undated) DEVICE — ELECTRODE PT RET AD L9FT HI MOIST COND ADH HYDRGEL CORDED

## (undated) DEVICE — CANNULA NSL ORAL AD FOR CAPNOFLEX CO2 O2 AIRLFE

## (undated) DEVICE — REDUCER: Brand: ENDOWRIST

## (undated) DEVICE — LUBE JELLY FOIL PACK 1.4 OZ: Brand: MEDLINE INDUSTRIES, INC.

## (undated) DEVICE — SUTURE MONOCRYL + SZ 4-0 L27IN ABSRB UD L19MM PS-2 3/8 CIR MCP426H

## (undated) DEVICE — MARKER SURG SKIN UTIL BLK REG TIP NONSMEARING W/ 6IN RUL

## (undated) DEVICE — APPLICATOR MEDICATED 26 CC SOLUTION HI LT ORNG CHLORAPREP

## (undated) DEVICE — CONTAINER FORMALIN PREFILLED 10% NBF 60ML

## (undated) DEVICE — BLADELESS OBTURATOR: Brand: WECK VISTA

## (undated) DEVICE — CONNECTOR TBNG OD5-7MM O2 END DISP

## (undated) DEVICE — COLUMN DRAPE

## (undated) DEVICE — SUTURE VICRYL + SZ 2-0 L27IN ABSRB VLT SH 1/2 CIR TAPERPOINT VCP317H

## (undated) DEVICE — SUTURE PERMA-HAND SZ 2-0 L30IN NONABSORBABLE BLK L26MM SH K833H

## (undated) DEVICE — KENDALL RADIOLUCENT FOAM MONITORING ELECTRODE RECTANGULAR SHAPE: Brand: KENDALL

## (undated) DEVICE — GAUZE,SPONGE,4"X4",12PLY,WOVEN,NS,LF: Brand: MEDLINE

## (undated) DEVICE — SINGLE PORT MANIFOLD: Brand: NEPTUNE 2

## (undated) DEVICE — SYRINGE MEDICAL 3ML CLEAR PLASTIC STANDARD NON CONTROL LUERLOCK TIP DISPOSABLE

## (undated) DEVICE — STAPLER 60 RELOAD WHITE: Brand: SUREFORM

## (undated) DEVICE — SYRINGE, LUER SLIP, STERILE, 60ML: Brand: MEDLINE

## (undated) DEVICE — MOUTHPIECE ENDOSCP L CTRL OPN AND SIDE PORTS DISP

## (undated) DEVICE — SEALANT TISS 4 CC FIBRIN VISTASEAL

## (undated) DEVICE — BLOCK BITE AD 60FR W/ VELC STRP ADDRESSES MOST PT AND

## (undated) DEVICE — SOLUTION IRRIG 1000ML STRL H2O USP PLAS POUR BTL

## (undated) DEVICE — SEAL

## (undated) DEVICE — LIQUIBAND RAPID ADHESIVE 36/CS 0.8ML: Brand: MEDLINE

## (undated) DEVICE — SINGLE BASIN: Brand: CARDINAL HEALTH

## (undated) DEVICE — ENDOSCOPIC KIT 1.1+ OP4 CA DE 2 GWN AAMI LEVEL 3

## (undated) DEVICE — NEEDLE SYR 18GA L1.5IN RED PLAS HUB S STL BLNT FILL W/O

## (undated) DEVICE — TIP COVER ACCESSORY